# Patient Record
Sex: MALE | Race: WHITE | NOT HISPANIC OR LATINO | URBAN - METROPOLITAN AREA
[De-identification: names, ages, dates, MRNs, and addresses within clinical notes are randomized per-mention and may not be internally consistent; named-entity substitution may affect disease eponyms.]

---

## 2020-09-04 ENCOUNTER — OUTPATIENT (OUTPATIENT)
Dept: OUTPATIENT SERVICES | Facility: HOSPITAL | Age: 55
LOS: 1 days | End: 2020-09-04
Payer: COMMERCIAL

## 2020-09-04 VITALS
WEIGHT: 243.17 LBS | DIASTOLIC BLOOD PRESSURE: 96 MMHG | TEMPERATURE: 97 F | HEIGHT: 71 IN | HEART RATE: 108 BPM | SYSTOLIC BLOOD PRESSURE: 124 MMHG | RESPIRATION RATE: 20 BRPM

## 2020-09-04 DIAGNOSIS — Z91.89 OTHER SPECIFIED PERSONAL RISK FACTORS, NOT ELSEWHERE CLASSIFIED: ICD-10-CM

## 2020-09-04 DIAGNOSIS — Z98.890 OTHER SPECIFIED POSTPROCEDURAL STATES: Chronic | ICD-10-CM

## 2020-09-04 DIAGNOSIS — M51.26 OTHER INTERVERTEBRAL DISC DISPLACEMENT, LUMBAR REGION: ICD-10-CM

## 2020-09-04 DIAGNOSIS — J45.909 UNSPECIFIED ASTHMA, UNCOMPLICATED: ICD-10-CM

## 2020-09-04 DIAGNOSIS — E11.9 TYPE 2 DIABETES MELLITUS WITHOUT COMPLICATIONS: ICD-10-CM

## 2020-09-04 DIAGNOSIS — S82.899A OTHER FRACTURE OF UNSPECIFIED LOWER LEG, INITIAL ENCOUNTER FOR CLOSED FRACTURE: Chronic | ICD-10-CM

## 2020-09-04 DIAGNOSIS — Z01.818 ENCOUNTER FOR OTHER PREPROCEDURAL EXAMINATION: ICD-10-CM

## 2020-09-04 DIAGNOSIS — Z29.9 ENCOUNTER FOR PROPHYLACTIC MEASURES, UNSPECIFIED: ICD-10-CM

## 2020-09-04 DIAGNOSIS — I10 ESSENTIAL (PRIMARY) HYPERTENSION: ICD-10-CM

## 2020-09-04 LAB
A1C WITH ESTIMATED AVERAGE GLUCOSE RESULT: 6.5 % — HIGH (ref 4–5.6)
ANION GAP SERPL CALC-SCNC: 16 MMOL/L — SIGNIFICANT CHANGE UP (ref 5–17)
APTT BLD: 29.5 SEC — SIGNIFICANT CHANGE UP (ref 27.5–35.5)
BASOPHILS # BLD AUTO: 0.04 K/UL — SIGNIFICANT CHANGE UP (ref 0–0.2)
BASOPHILS NFR BLD AUTO: 0.4 % — SIGNIFICANT CHANGE UP (ref 0–2)
BLD GP AB SCN SERPL QL: SIGNIFICANT CHANGE UP
BUN SERPL-MCNC: 23 MG/DL — HIGH (ref 8–20)
CALCIUM SERPL-MCNC: 9.9 MG/DL — SIGNIFICANT CHANGE UP (ref 8.6–10.2)
CHLORIDE SERPL-SCNC: 98 MMOL/L — SIGNIFICANT CHANGE UP (ref 98–107)
CO2 SERPL-SCNC: 24 MMOL/L — SIGNIFICANT CHANGE UP (ref 22–29)
CREAT SERPL-MCNC: 1.01 MG/DL — SIGNIFICANT CHANGE UP (ref 0.5–1.3)
EOSINOPHIL # BLD AUTO: 0.1 K/UL — SIGNIFICANT CHANGE UP (ref 0–0.5)
EOSINOPHIL NFR BLD AUTO: 0.9 % — SIGNIFICANT CHANGE UP (ref 0–6)
ESTIMATED AVERAGE GLUCOSE: 140 MG/DL — HIGH (ref 68–114)
GLUCOSE SERPL-MCNC: 114 MG/DL — HIGH (ref 70–99)
HCT VFR BLD CALC: 42.8 % — SIGNIFICANT CHANGE UP (ref 39–50)
HGB BLD-MCNC: 15.2 G/DL — SIGNIFICANT CHANGE UP (ref 13–17)
IMM GRANULOCYTES NFR BLD AUTO: 0.4 % — SIGNIFICANT CHANGE UP (ref 0–1.5)
INR BLD: 1.06 RATIO — SIGNIFICANT CHANGE UP (ref 0.88–1.16)
LYMPHOCYTES # BLD AUTO: 2.71 K/UL — SIGNIFICANT CHANGE UP (ref 1–3.3)
LYMPHOCYTES # BLD AUTO: 24.6 % — SIGNIFICANT CHANGE UP (ref 13–44)
MCHC RBC-ENTMCNC: 32.1 PG — SIGNIFICANT CHANGE UP (ref 27–34)
MCHC RBC-ENTMCNC: 35.5 GM/DL — SIGNIFICANT CHANGE UP (ref 32–36)
MCV RBC AUTO: 90.3 FL — SIGNIFICANT CHANGE UP (ref 80–100)
MONOCYTES # BLD AUTO: 1.24 K/UL — HIGH (ref 0–0.9)
MONOCYTES NFR BLD AUTO: 11.2 % — SIGNIFICANT CHANGE UP (ref 2–14)
MRSA PCR RESULT.: SIGNIFICANT CHANGE UP
NEUTROPHILS # BLD AUTO: 6.9 K/UL — SIGNIFICANT CHANGE UP (ref 1.8–7.4)
NEUTROPHILS NFR BLD AUTO: 62.5 % — SIGNIFICANT CHANGE UP (ref 43–77)
PLATELET # BLD AUTO: 215 K/UL — SIGNIFICANT CHANGE UP (ref 150–400)
POTASSIUM SERPL-MCNC: 3.9 MMOL/L — SIGNIFICANT CHANGE UP (ref 3.5–5.3)
POTASSIUM SERPL-SCNC: 3.9 MMOL/L — SIGNIFICANT CHANGE UP (ref 3.5–5.3)
PROTHROM AB SERPL-ACNC: 12.3 SEC — SIGNIFICANT CHANGE UP (ref 10.6–13.6)
RBC # BLD: 4.74 M/UL — SIGNIFICANT CHANGE UP (ref 4.2–5.8)
RBC # FLD: 11.9 % — SIGNIFICANT CHANGE UP (ref 10.3–14.5)
S AUREUS DNA NOSE QL NAA+PROBE: DETECTED
SODIUM SERPL-SCNC: 138 MMOL/L — SIGNIFICANT CHANGE UP (ref 135–145)
WBC # BLD: 11.03 K/UL — HIGH (ref 3.8–10.5)
WBC # FLD AUTO: 11.03 K/UL — HIGH (ref 3.8–10.5)

## 2020-09-04 PROCEDURE — 93005 ELECTROCARDIOGRAM TRACING: CPT

## 2020-09-04 PROCEDURE — 93010 ELECTROCARDIOGRAM REPORT: CPT

## 2020-09-04 NOTE — H&P PST ADULT - TOBACCO USE
history of brain tumor, non compliant with medications. family wanted him brought to a facility, patient became aggressive    picked up by ems today for aggressive behavior Current some day smoker

## 2020-09-04 NOTE — H&P PST ADULT - NSICDXPASTMEDICALHX_GEN_ALL_CORE_FT
PAST MEDICAL HISTORY:  Asthma     HLD (hyperlipidemia)     Hypertension     No pertinent past medical history     Tear meniscus knee Left    Type 2 diabetes mellitus PAST MEDICAL HISTORY:  Asthma     Disc displacement, lumbar     HLD (hyperlipidemia)     Hypertension     Panic attacks     Tear meniscus knee Left    Type 2 diabetes mellitus

## 2020-09-04 NOTE — H&P PST ADULT - LAB RESULTS AND INTERPRETATION
labs pending WBC 11.03  BUN 23.0  A1C 6.5  labs faxed to Dr. Sotelo WBC 11.03  BUN 23.0  A1C 6.5  mssa positive prescription sent to pharmacy , pt notified and  Dr. Rendon  office called message left for veronica . K DAmico Np 6 pm 9/4/2020  labs faxed to Dr. Sotelo

## 2020-09-04 NOTE — H&P PST ADULT - MUSCULOSKELETAL
details… detailed exam decreased ROM/no calf tenderness/normal strength/neck dec ROM, lower back dec ROM

## 2020-09-04 NOTE — H&P PST ADULT - NSANTHOSAYNRD_GEN_A_CORE
No. JEROMY screening performed.  STOP BANG Legend: 0-2 = LOW Risk; 3-4 = INTERMEDIATE Risk; 5-8 = HIGH Risk

## 2020-09-04 NOTE — H&P PST ADULT - NSICDXPASTSURGICALHX_GEN_ALL_CORE_FT
PAST SURGICAL HISTORY:  Fx ankle surgery to repair 1992    Status post medial meniscal repair Leftx2 2007,2015

## 2020-09-04 NOTE — H&P PST ADULT - PSY GEN HX ROS MEA POS PC
depression/anxiety/anxious hisstory of panic attacks, some depression, NO SI anxiety/depression/anxious history of panic attacks, some depression, NO SI

## 2020-09-04 NOTE — H&P PST ADULT - ASSESSMENT
OPIOID RISK TOOL    ROCHELLE EACH BOX THAT APPLIES AND ADD TOTALS AT THE END    FAMILY HISTORY OF SUBSTANCE ABUSE                 FEMALE         MALE                                                Alcohol                             [  ]1 pt          [  ]3pts                                               Illegal Durgs                     [  ]2 pts        [  ]3pts                                               Rx Drugs                           [  ]4 pts        [  ]4 pts    PERSONAL HISTORY OF SUBSTANCE ABUSE                                                                                          Alcohol                             [  ]3 pts       [  ]3 pts                                               Illegal Durgs                     [  ]4 pts        [  ]4 pts                                               Rx Drugs                           [  ]5 pts        [  ]5 pts    AGE BETWEEN 16-45 YEARS                                      [  ]1 pt         [  ]1 pt    HISTORY OF PREADOLESCENT   SEXUAL ABUSE                                                             [  ]3 pts        [  ]0pts    PSYCHOLOGICAL DISEASE                     ADD, OCD, Bipolar, Schizophrenia        [  ]2 pts         [  ]2 pts                      Depression                                               [  ]1 pt           [  ]1 pt           SCORING TOTAL   0                                A score of 3 or lower indicated LOW risk for future opiod abuse  A score of 4 to 7 indicated moderate risk for future opiod abuse  A score of 8 or higher indicates a high risk for opiod abuse    CAPRINI SCORE [CLOT]    AGE RELATED RISK FACTORS                                                       MOBILITY RELATED FACTORS  [x] Age 41-60 years                                            (1 Point)                  [ ] Bed rest                                                        (1 Point)  [ ] Age: 61-74 years                                           (2 Points)                 [ ] Plaster cast                                                   (2 Points)  [ ] Age= 75 years                                              (3 Points)                 [ ] Bed bound for more than 72 hours                 (2 Points)    DISEASE RELATED RISK FACTORS                                               GENDER SPECIFIC FACTORS  [ ] Edema in the lower extremities                       (1 Point)                  [ ] Pregnancy                                                     (1 Point)  [ ] Varicose veins                                               (1 Point)                  [ ] Post-partum < 6 weeks                                   (1 Point)             [x ] BMI > 25 Kg/m2                                            (1 Point)                  [ ] Hormonal therapy  or oral contraception          (1 Point)                 [ ] Sepsis (in the previous month)                        (1 Point)                  [ ] History of pregnancy complications                 (1 point)  [ ] Pneumonia or serious lung disease                                               [ ] Unexplained or recurrent                     (1 Point)           (in the previous month)                               (1 Point)  [ ] Abnormal pulmonary function test                     (1 Point)                 SURGERY RELATED RISK FACTORS  [ ] Acute myocardial infarction                              (1 Point)                 [ ]  Section                                             (1 Point)  [ ] Congestive heart failure (in the previous month)  (1 Point)               [ ] Minor surgery                                                  (1 Point)   [ ] Inflammatory bowel disease                             (1 Point)                 [ ] Arthroscopic surgery                                        (2 Points)  [ ] Central venous access                                      (2 Points)                [x ] General surgery lasting more than 45 minutes   (2 Points)       [ ] Stroke (in the previous month)                          (5 Points)               [ ] Elective arthroplasty                                         (5 Points)                                                                                                                                               HEMATOLOGY RELATED FACTORS                                                 TRAUMA RELATED RISK FACTORS  [ ] Prior episodes of VTE                                     (3 Points)                [ ] Fracture of the hip, pelvis, or leg                       (5 Points)  [ ] Positive family history for VTE                         (3 Points)                 [ ] Acute spinal cord injury (in the previous month)  (5 Points)  [ ] Prothrombin 24903 A                                     (3 Points)                 [ ] Paralysis  (less than 1 month)                             (5 Points)  [ ] Factor V Leiden                                             (3 Points)                  [ ] Multiple Trauma within 1 month                        (5 Points)  [ ] Lupus anticoagulants                                     (3 Points)                                                           [ ] Anticardiolipin antibodies                               (3 Points)                                                       [ ] High homocysteine in the blood                      (3 Points)                                             [ ] Other congenital or acquired thrombophilia      (3 Points)                                                [ ] Heparin induced thrombocytopenia                  (3 Points)                                          Total Score [    4      ]    Caprini Score 0 - 2:  Low Risk, No VTE Prophylaxis required for most patients, encourage ambulation  Caprini Score 3 - 6:  At Risk, pharmacologic VTE prophylaxis is indicated for most patients (in the absence of a contraindication)  Caprini Score Greater than or = 7:  High Risk, pharmacologic VTE prophylaxis is indicated for most patients (in the absence of a contraindication)    Patient is scheduled for lumbar laminectomy and discectomy L4-L5, L5-S1 RIGHT with Dr. Rendon on 20 OPIOID RISK TOOL    ROCHELLE EACH BOX THAT APPLIES AND ADD TOTALS AT THE END    FAMILY HISTORY OF SUBSTANCE ABUSE                 FEMALE         MALE                                                Alcohol                             [  ]1 pt          [  ]3pts                                               Illegal Durgs                     [  ]2 pts        [  ]3pts                                               Rx Drugs                           [  ]4 pts        [  ]4 pts    PERSONAL HISTORY OF SUBSTANCE ABUSE                                                                                          Alcohol                             [  ]3 pts       [  ]3 pts                                               Illegal Durgs                     [  ]4 pts        [  ]4 pts                                               Rx Drugs                           [  ]5 pts        [  ]5 pts    AGE BETWEEN 16-45 YEARS                                      [  ]1 pt         [  ]1 pt    HISTORY OF PREADOLESCENT   SEXUAL ABUSE                                                             [  ]3 pts        [  ]0pts    PSYCHOLOGICAL DISEASE                     ADD, OCD, Bipolar, Schizophrenia        [  ]2 pts         [  ]2 pts                      Depression                                               [  ]1 pt           [  ]1 pt           SCORING TOTAL   0                                A score of 3 or lower indicated LOW risk for future opiod abuse  A score of 4 to 7 indicated moderate risk for future opiod abuse  A score of 8 or higher indicates a high risk for opiod abuse    CAPRINI SCORE [CLOT]    AGE RELATED RISK FACTORS                                                       MOBILITY RELATED FACTORS  [x] Age 41-60 years                                            (1 Point)                  [ ] Bed rest                                                        (1 Point)  [ ] Age: 61-74 years                                           (2 Points)                 [ ] Plaster cast                                                   (2 Points)  [ ] Age= 75 years                                              (3 Points)                 [ ] Bed bound for more than 72 hours                 (2 Points)    DISEASE RELATED RISK FACTORS                                               GENDER SPECIFIC FACTORS  [ ] Edema in the lower extremities                       (1 Point)                  [ ] Pregnancy                                                     (1 Point)  [ ] Varicose veins                                               (1 Point)                  [ ] Post-partum < 6 weeks                                   (1 Point)             [x ] BMI > 25 Kg/m2                                            (1 Point)                  [ ] Hormonal therapy  or oral contraception          (1 Point)                 [ ] Sepsis (in the previous month)                        (1 Point)                  [ ] History of pregnancy complications                 (1 point)  [ ] Pneumonia or serious lung disease                                               [ ] Unexplained or recurrent                     (1 Point)           (in the previous month)                               (1 Point)  [ ] Abnormal pulmonary function test                     (1 Point)                 SURGERY RELATED RISK FACTORS  [ ] Acute myocardial infarction                              (1 Point)                 [ ]  Section                                             (1 Point)  [ ] Congestive heart failure (in the previous month)  (1 Point)               [ ] Minor surgery                                                  (1 Point)   [ ] Inflammatory bowel disease                             (1 Point)                 [ ] Arthroscopic surgery                                        (2 Points)  [ ] Central venous access                                      (2 Points)                [x ] General surgery lasting more than 45 minutes   (2 Points)       [ ] Stroke (in the previous month)                          (5 Points)               [ ] Elective arthroplasty                                         (5 Points)                                                                                                                                               HEMATOLOGY RELATED FACTORS                                                 TRAUMA RELATED RISK FACTORS  [ ] Prior episodes of VTE                                     (3 Points)                [ ] Fracture of the hip, pelvis, or leg                       (5 Points)  [ ] Positive family history for VTE                         (3 Points)                 [ ] Acute spinal cord injury (in the previous month)  (5 Points)  [ ] Prothrombin 53860 A                                     (3 Points)                 [ ] Paralysis  (less than 1 month)                             (5 Points)  [ ] Factor V Leiden                                             (3 Points)                  [ ] Multiple Trauma within 1 month                        (5 Points)  [ ] Lupus anticoagulants                                     (3 Points)                                                           [ ] Anticardiolipin antibodies                               (3 Points)                                                       [ ] High homocysteine in the blood                      (3 Points)                                             [ ] Other congenital or acquired thrombophilia      (3 Points)                                                [ ] Heparin induced thrombocytopenia                  (3 Points)                                          Total Score [    4      ]    Caprini Score 0 - 2:  Low Risk, No VTE Prophylaxis required for most patients, encourage ambulation  Caprini Score 3 - 6:  At Risk, pharmacologic VTE prophylaxis is indicated for most patients (in the absence of a contraindication)  Caprini Score Greater than or = 7:  High Risk, pharmacologic VTE prophylaxis is indicated for most patients (in the absence of a contraindication)    Patient is scheduled for lumbar laminotomy and discectomy L4-L5, L5-S1 RIGHT with Dr. Rendon on 20

## 2020-09-04 NOTE — PATIENT PROFILE ADULT - NSPROEDALEARNPREF_GEN_A_NUR
individual instruction/verbal instruction/written material/Instructed pt on pre-op instructions/teaching, tips for safer surgery, pain management scale, pre-surgical infection prevention instructions, MRSA/MSSA instructions, Covid swab appt info, Diabetes club schedule, smoking cessation center for tobacco control course schedule given. Pt verbalized understanding of all instructions given. verbal instruction/individual instruction/written material/Instructed pt on pre-op instructions/teaching, tips for safer surgery, pain management scale, pre-surgical infection prevention instructions, MRSA/MSSA instructions, Covid swab appt info, Diabetes club schedule, smoking cessation center for tobacco control course schedule given, Spine Book given at PST. Pt verbalized understanding of all instructions given.

## 2020-09-04 NOTE — PATIENT PROFILE ADULT - NSPROCHRONICPAIN_GEN_A_NUR
Pain at rest - 6/10  Pain with activity - 9/10  Duration -  2014  Constant, spasm, aching, sharp, shooting pain/yes

## 2020-09-04 NOTE — H&P PST ADULT - HISTORY OF PRESENT ILLNESS
sustained an injury while working as an , fell through a flight of steps in January 2015. managing conservatively with tramadol and celebrex.  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 Rodas  worse with sitting/ standing  relieved with laying down  radiates down right leg numbness/tingling  sharp pain 9/10  urinary- weak stream   bowel  no constipation    occasional panic attacks 54 year old male with a pmhx of HTN, HLD, DM Type 2, sustained an injury while working as an , fell through a flight of steps in January 2015. managing conservatively with tramadol and celebrex.  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 Rodas  worse with sitting/ standing  relieved with laying down  radiates down right leg numbness/tingling  sharp pain 9/10  urinary- weak stream   bowel  no constipation    occasional panic attacks 54 year old male with a pmhx of HTN, HLD, DM Type 2, panic attacks, disc displacement with nerve compression L-L5-S1.  The patient states he sustained an injury while working as an  and fell through a flight of steps in January 2015. He has been managing conservatively with pain medications (Tramadol 100mg twice daily, Celebrex 200mg twice a day).  He has received epidural injections in the past, however suffered a spinal cord leak and has discontinued since.  He follows with pain management Dr. Rodas.  He states that the pain is sharp 9/10, radiates down his right leg + numbness and tingling, worse with standing and sitting, relieved with laying down. He reports a weak urinary stream but denies bowel or bladder incontinence. Patient is scheduled for lumbar laminectomy and discectomy L4-L5, L5-S1 RIGHT with Dr. Rendon on 9/22/20 54 year old male with a pmhx of HTN, HLD, DM Type 2, panic attacks, disc displacement with nerve compression L-L5-S1.  The patient states he sustained an injury while working as an  and fell through a flight of steps in January 2015. He has been managing conservatively with pain medications (Tramadol 100mg twice daily, Celebrex 200mg twice a day).  He has received epidural injections in the past, however suffered a spinal cord leak and has discontinued since.  He follows with pain management Dr. Rodas.  He states that the pain is sharp 9/10, radiates down his right leg + numbness and tingling, worse with standing and sitting, relieved with laying down. He reports a weak urinary stream but denies bowel or bladder incontinence. Patient is scheduled for lumbar laminotomy and discectomy L4-L5, L5-S1 RIGHT with Dr. Rendon on 9/22/20

## 2020-09-04 NOTE — H&P PST ADULT - NSICDXFAMILYHX_GEN_ALL_CORE_FT
FAMILY HISTORY:  Father  Still living? Unknown  Family history of diabetes mellitus (DM), Age at diagnosis: Age Unknown    Mother  Still living? Unknown  FH: esophageal cancer, Age at diagnosis: Age Unknown    Grandparent  Still living? Unknown  FH: prostate cancer, Age at diagnosis: Age Unknown

## 2020-09-17 DIAGNOSIS — Z01.818 ENCOUNTER FOR OTHER PREPROCEDURAL EXAMINATION: ICD-10-CM

## 2020-09-19 ENCOUNTER — APPOINTMENT (OUTPATIENT)
Dept: DISASTER EMERGENCY | Facility: CLINIC | Age: 55
End: 2020-09-19

## 2020-09-20 LAB — SARS-COV-2 N GENE NPH QL NAA+PROBE: NOT DETECTED

## 2020-09-21 ENCOUNTER — TRANSCRIPTION ENCOUNTER (OUTPATIENT)
Age: 55
End: 2020-09-21

## 2020-09-22 ENCOUNTER — INPATIENT (INPATIENT)
Facility: HOSPITAL | Age: 55
LOS: 0 days | Discharge: HOME | DRG: 517 | End: 2020-09-23
Attending: SPECIALIST | Admitting: SPECIALIST
Payer: COMMERCIAL

## 2020-09-22 ENCOUNTER — TRANSCRIPTION ENCOUNTER (OUTPATIENT)
Age: 55
End: 2020-09-22

## 2020-09-22 VITALS
HEIGHT: 71 IN | HEART RATE: 93 BPM | OXYGEN SATURATION: 96 % | WEIGHT: 243.17 LBS | RESPIRATION RATE: 16 BRPM | SYSTOLIC BLOOD PRESSURE: 127 MMHG | DIASTOLIC BLOOD PRESSURE: 90 MMHG | TEMPERATURE: 97 F

## 2020-09-22 DIAGNOSIS — Z98.890 OTHER SPECIFIED POSTPROCEDURAL STATES: Chronic | ICD-10-CM

## 2020-09-22 DIAGNOSIS — S82.899A OTHER FRACTURE OF UNSPECIFIED LOWER LEG, INITIAL ENCOUNTER FOR CLOSED FRACTURE: Chronic | ICD-10-CM

## 2020-09-22 DIAGNOSIS — M51.26 OTHER INTERVERTEBRAL DISC DISPLACEMENT, LUMBAR REGION: ICD-10-CM

## 2020-09-22 LAB
ABO RH CONFIRMATION: SIGNIFICANT CHANGE UP
GLUCOSE BLDC GLUCOMTR-MCNC: 124 MG/DL — HIGH (ref 70–99)
GLUCOSE BLDC GLUCOMTR-MCNC: 138 MG/DL — HIGH (ref 70–99)
GLUCOSE BLDC GLUCOMTR-MCNC: 308 MG/DL — HIGH (ref 70–99)

## 2020-09-22 RX ORDER — HYDROMORPHONE HYDROCHLORIDE 2 MG/ML
1 INJECTION INTRAMUSCULAR; INTRAVENOUS; SUBCUTANEOUS
Refills: 0 | Status: DISCONTINUED | OUTPATIENT
Start: 2020-09-22 | End: 2020-09-22

## 2020-09-22 RX ORDER — ACETAMINOPHEN 500 MG
650 TABLET ORAL EVERY 6 HOURS
Refills: 0 | Status: DISCONTINUED | OUTPATIENT
Start: 2020-09-22 | End: 2020-09-23

## 2020-09-22 RX ORDER — DEXTROSE MONOHYDRATE, SODIUM CHLORIDE, AND POTASSIUM CHLORIDE 50; .745; 4.5 G/1000ML; G/1000ML; G/1000ML
1000 INJECTION, SOLUTION INTRAVENOUS
Refills: 0 | Status: DISCONTINUED | OUTPATIENT
Start: 2020-09-22 | End: 2020-09-23

## 2020-09-22 RX ORDER — PANTOPRAZOLE SODIUM 20 MG/1
40 TABLET, DELAYED RELEASE ORAL
Refills: 0 | Status: DISCONTINUED | OUTPATIENT
Start: 2020-09-22 | End: 2020-09-23

## 2020-09-22 RX ORDER — DEXTROSE 50 % IN WATER 50 %
15 SYRINGE (ML) INTRAVENOUS ONCE
Refills: 0 | Status: DISCONTINUED | OUTPATIENT
Start: 2020-09-22 | End: 2020-09-23

## 2020-09-22 RX ORDER — DEXTROSE 50 % IN WATER 50 %
12.5 SYRINGE (ML) INTRAVENOUS ONCE
Refills: 0 | Status: DISCONTINUED | OUTPATIENT
Start: 2020-09-22 | End: 2020-09-23

## 2020-09-22 RX ORDER — INSULIN LISPRO 100/ML
VIAL (ML) SUBCUTANEOUS
Refills: 0 | Status: DISCONTINUED | OUTPATIENT
Start: 2020-09-22 | End: 2020-09-23

## 2020-09-22 RX ORDER — DEXTROSE 50 % IN WATER 50 %
25 SYRINGE (ML) INTRAVENOUS ONCE
Refills: 0 | Status: DISCONTINUED | OUTPATIENT
Start: 2020-09-22 | End: 2020-09-23

## 2020-09-22 RX ORDER — SODIUM CHLORIDE 9 MG/ML
3 INJECTION INTRAMUSCULAR; INTRAVENOUS; SUBCUTANEOUS EVERY 8 HOURS
Refills: 0 | Status: DISCONTINUED | OUTPATIENT
Start: 2020-09-22 | End: 2020-09-22

## 2020-09-22 RX ORDER — ONDANSETRON 8 MG/1
4 TABLET, FILM COATED ORAL ONCE
Refills: 0 | Status: DISCONTINUED | OUTPATIENT
Start: 2020-09-22 | End: 2020-09-22

## 2020-09-22 RX ORDER — SODIUM CHLORIDE 9 MG/ML
1000 INJECTION, SOLUTION INTRAVENOUS
Refills: 0 | Status: DISCONTINUED | OUTPATIENT
Start: 2020-09-22 | End: 2020-09-22

## 2020-09-22 RX ORDER — HYDROCHLOROTHIAZIDE 25 MG
12.5 TABLET ORAL DAILY
Refills: 0 | Status: DISCONTINUED | OUTPATIENT
Start: 2020-09-22 | End: 2020-09-23

## 2020-09-22 RX ORDER — HYDROMORPHONE HYDROCHLORIDE 2 MG/ML
1 INJECTION INTRAMUSCULAR; INTRAVENOUS; SUBCUTANEOUS EVERY 6 HOURS
Refills: 0 | Status: DISCONTINUED | OUTPATIENT
Start: 2020-09-22 | End: 2020-09-23

## 2020-09-22 RX ORDER — OXYCODONE AND ACETAMINOPHEN 5; 325 MG/1; MG/1
2 TABLET ORAL EVERY 6 HOURS
Refills: 0 | Status: DISCONTINUED | OUTPATIENT
Start: 2020-09-22 | End: 2020-09-23

## 2020-09-22 RX ORDER — CEFAZOLIN SODIUM 1 G
2000 VIAL (EA) INJECTION ONCE
Refills: 0 | Status: DISCONTINUED | OUTPATIENT
Start: 2020-09-22 | End: 2020-09-22

## 2020-09-22 RX ORDER — ONDANSETRON 8 MG/1
4 TABLET, FILM COATED ORAL EVERY 8 HOURS
Refills: 0 | Status: DISCONTINUED | OUTPATIENT
Start: 2020-09-22 | End: 2020-09-23

## 2020-09-22 RX ORDER — HYDROMORPHONE HYDROCHLORIDE 2 MG/ML
0.5 INJECTION INTRAMUSCULAR; INTRAVENOUS; SUBCUTANEOUS ONCE
Refills: 0 | Status: DISCONTINUED | OUTPATIENT
Start: 2020-09-22 | End: 2020-09-22

## 2020-09-22 RX ORDER — ALBUTEROL 90 UG/1
2 AEROSOL, METERED ORAL EVERY 4 HOURS
Refills: 0 | Status: DISCONTINUED | OUTPATIENT
Start: 2020-09-22 | End: 2020-09-23

## 2020-09-22 RX ORDER — CELECOXIB 200 MG/1
200 CAPSULE ORAL
Refills: 0 | Status: DISCONTINUED | OUTPATIENT
Start: 2020-09-22 | End: 2020-09-23

## 2020-09-22 RX ORDER — FENTANYL CITRATE 50 UG/ML
50 INJECTION INTRAVENOUS
Refills: 0 | Status: DISCONTINUED | OUTPATIENT
Start: 2020-09-22 | End: 2020-09-22

## 2020-09-22 RX ORDER — VALSARTAN 80 MG/1
160 TABLET ORAL DAILY
Refills: 0 | Status: DISCONTINUED | OUTPATIENT
Start: 2020-09-22 | End: 2020-09-23

## 2020-09-22 RX ORDER — CYCLOBENZAPRINE HYDROCHLORIDE 10 MG/1
10 TABLET, FILM COATED ORAL EVERY 8 HOURS
Refills: 0 | Status: DISCONTINUED | OUTPATIENT
Start: 2020-09-22 | End: 2020-09-23

## 2020-09-22 RX ORDER — ALPRAZOLAM 0.25 MG
1 TABLET ORAL DAILY
Refills: 0 | Status: DISCONTINUED | OUTPATIENT
Start: 2020-09-22 | End: 2020-09-23

## 2020-09-22 RX ORDER — ATORVASTATIN CALCIUM 80 MG/1
20 TABLET, FILM COATED ORAL AT BEDTIME
Refills: 0 | Status: DISCONTINUED | OUTPATIENT
Start: 2020-09-22 | End: 2020-09-23

## 2020-09-22 RX ORDER — OXYCODONE AND ACETAMINOPHEN 5; 325 MG/1; MG/1
1 TABLET ORAL EVERY 4 HOURS
Refills: 0 | Status: DISCONTINUED | OUTPATIENT
Start: 2020-09-22 | End: 2020-09-23

## 2020-09-22 RX ORDER — SODIUM CHLORIDE 9 MG/ML
1000 INJECTION, SOLUTION INTRAVENOUS
Refills: 0 | Status: DISCONTINUED | OUTPATIENT
Start: 2020-09-22 | End: 2020-09-23

## 2020-09-22 RX ORDER — GLUCAGON INJECTION, SOLUTION 0.5 MG/.1ML
1 INJECTION, SOLUTION SUBCUTANEOUS ONCE
Refills: 0 | Status: DISCONTINUED | OUTPATIENT
Start: 2020-09-22 | End: 2020-09-23

## 2020-09-22 RX ADMIN — HYDROMORPHONE HYDROCHLORIDE 1 MILLIGRAM(S): 2 INJECTION INTRAMUSCULAR; INTRAVENOUS; SUBCUTANEOUS at 16:18

## 2020-09-22 RX ADMIN — Medication 8: at 21:30

## 2020-09-22 RX ADMIN — CELECOXIB 200 MILLIGRAM(S): 200 CAPSULE ORAL at 21:29

## 2020-09-22 RX ADMIN — OXYCODONE AND ACETAMINOPHEN 2 TABLET(S): 5; 325 TABLET ORAL at 19:41

## 2020-09-22 RX ADMIN — CELECOXIB 200 MILLIGRAM(S): 200 CAPSULE ORAL at 22:25

## 2020-09-22 RX ADMIN — DEXTROSE MONOHYDRATE, SODIUM CHLORIDE, AND POTASSIUM CHLORIDE 75 MILLILITER(S): 50; .745; 4.5 INJECTION, SOLUTION INTRAVENOUS at 19:42

## 2020-09-22 RX ADMIN — OXYCODONE AND ACETAMINOPHEN 2 TABLET(S): 5; 325 TABLET ORAL at 20:40

## 2020-09-22 RX ADMIN — HYDROMORPHONE HYDROCHLORIDE 0.5 MILLIGRAM(S): 2 INJECTION INTRAMUSCULAR; INTRAVENOUS; SUBCUTANEOUS at 22:30

## 2020-09-22 RX ADMIN — HYDROMORPHONE HYDROCHLORIDE 0.5 MILLIGRAM(S): 2 INJECTION INTRAMUSCULAR; INTRAVENOUS; SUBCUTANEOUS at 21:35

## 2020-09-22 RX ADMIN — ATORVASTATIN CALCIUM 20 MILLIGRAM(S): 80 TABLET, FILM COATED ORAL at 23:19

## 2020-09-22 NOTE — DISCHARGE NOTE PROVIDER - NSDCCPTREATMENT_GEN_ALL_CORE_FT
PRINCIPAL PROCEDURE  Procedure: Laminotomy and foraminotomy, spine, lumbar  Findings and Treatment:

## 2020-09-22 NOTE — DISCHARGE NOTE PROVIDER - NSDCFUADDINST_GEN_ALL_CORE_FT
Follow-up with Dr. Rendon within 7-10 days. Dressing change daily until dry, then leave dressing in place until office follow-up. Pain medications as indicated and titrated to patient's needs.  Ambulate with assistance of walker. Contact office if the wound becomes red, opens or discharge increases.

## 2020-09-22 NOTE — DISCHARGE NOTE PROVIDER - NSDCMRMEDTOKEN_GEN_ALL_CORE_FT
Bydureon Pen 2 mg subcutaneous injection, extended release:   CeleBREX 200 mg oral capsule: orally 2 times a day  Lipitor 20 mg oral tablet: 1 tab(s) orally once a day  metFORMIN 500 mg oral tablet, extended release: orally 2 times a day  NexIUM 40 mg oral delayed release capsule: 1 cap(s) orally once a day  traMADol 100 mg oral tablet: 1 tab(s) orally 2 times a day  valsartan-hydrochlorothiazide 160mg-12.5mg oral tablet: 1 tab(s) orally once a day  Vascepa 1 g oral capsule: orally 4 times a day  Ventolin HFA 90 mcg/inh inhalation aerosol: 2 puff(s) inhaled every 6 hours  Xanax 1 mg oral tablet: orally once a day   Bydureon Pen 2 mg subcutaneous injection, extended release:   CeleBREX 200 mg oral capsule: orally 2 times a day  cyclobenzaprine 10 mg oral tablet: 1 tab(s) orally every 8 hours, As needed, spasm MDD:3  Lipitor 20 mg oral tablet: 1 tab(s) orally once a day  metFORMIN 500 mg oral tablet, extended release: orally 2 times a day  NexIUM 40 mg oral delayed release capsule: 1 cap(s) orally once a day  oxycodone-acetaminophen 5 mg-325 mg oral tablet: 1-2 tab(s) orally every 4 to 6 hours, As Needed  Pain MDD:6  Senna S 50 mg-8.6 mg oral tablet: 2 tab(s) orally once a day (at bedtime)   valsartan-hydrochlorothiazide 160mg-12.5mg oral tablet: 1 tab(s) orally once a day  Vascepa 1 g oral capsule: orally 4 times a day  Ventolin HFA 90 mcg/inh inhalation aerosol: 2 puff(s) inhaled every 6 hours  Xanax 1 mg oral tablet: orally once a day

## 2020-09-22 NOTE — PROGRESS NOTE ADULT - SUBJECTIVE AND OBJECTIVE BOX
Orthopedic PA Postop Note  Patient S/P L4-S1 lami and discectomy  Patient in bed comfortable     Neurological: Sensation is grossly intact to light touch. No focal deficits or weaknesses found.    Pathologic reflexes: negative babinski, negative clonus            Motor exam: [  ]          [ ] Upper extremity                   Bi(c5)  WE(c6)  EE(c7)   FF(c8)                                                R         5/5        5/5        5/5       5/5                                               L          5/5        5/5        5/5       5/5         [ ] Lower extremeity               HF(l2)   KE(l3)    TA(l4)   EHL(l5)  GS(s1)                                                 R        5/5        5/5        5/5       5/5         5/5                                               L         5/5        5/5       5/5       5/5          5/5     Vital Signs Last 24 Hrs  T(C): 36.8 (22 Sep 2020 20:37), Max: 36.8 (22 Sep 2020 20:37)  T(F): 98.3 (22 Sep 2020 20:37), Max: 98.3 (22 Sep 2020 20:37)  HR: 101 (22 Sep 2020 20:37) (73 - 101)  BP: 126/82 (22 Sep 2020 20:37) (100/72 - 127/90)  BP(mean): --  RR: 18 (22 Sep 2020 18:31) (11 - 18)  SpO2: 91% (22 Sep 2020 20:37) (91% - 100%)    A/P:  S/P L4-S1 lami and discectomy  1. DVTP - SCDs  2. Physical Therapy   3. Pain Control as clinically indicated

## 2020-09-22 NOTE — PHYSICAL THERAPY INITIAL EVALUATION ADULT - ADDITIONAL COMMENTS
pt reports he [place] brother [place] with 5 steps to enter + 5 inside. pt reports brother works during the day and will be alone until 6 pm. pt reports independence with all ADLs, drives and no DME.

## 2020-09-22 NOTE — DISCHARGE NOTE PROVIDER - NSDCCPCAREPLAN_GEN_ALL_CORE_FT
PRINCIPAL DISCHARGE DIAGNOSIS  Diagnosis: Other intervertebral disc displacement, lumbar region  Assessment and Plan of Treatment:

## 2020-09-22 NOTE — DISCHARGE NOTE PROVIDER - HOSPITAL COURSE
Patient was admitted for elective posterior lumbar laminectomy  on 9/22/20 for failed conservative treatment of persistent lower back pain, The hospital post operative course was uneventful.  The surgical dressing was changed and the drain was removed uneventfully.   PT/OT worked with patient for gait training.  Pain was now adequately controlled and patient was discharged home in stable condition.  Chronic medical conditions were treated during the hospital stay.

## 2020-09-23 ENCOUNTER — TRANSCRIPTION ENCOUNTER (OUTPATIENT)
Age: 55
End: 2020-09-23

## 2020-09-23 VITALS
SYSTOLIC BLOOD PRESSURE: 131 MMHG | DIASTOLIC BLOOD PRESSURE: 90 MMHG | RESPIRATION RATE: 17 BRPM | HEART RATE: 71 BPM | OXYGEN SATURATION: 93 % | TEMPERATURE: 98 F

## 2020-09-23 DIAGNOSIS — I10 ESSENTIAL (PRIMARY) HYPERTENSION: ICD-10-CM

## 2020-09-23 DIAGNOSIS — E78.5 HYPERLIPIDEMIA, UNSPECIFIED: ICD-10-CM

## 2020-09-23 DIAGNOSIS — E11.9 TYPE 2 DIABETES MELLITUS WITHOUT COMPLICATIONS: ICD-10-CM

## 2020-09-23 DIAGNOSIS — D72.829 ELEVATED WHITE BLOOD CELL COUNT, UNSPECIFIED: ICD-10-CM

## 2020-09-23 LAB
ANION GAP SERPL CALC-SCNC: 14 MMOL/L — SIGNIFICANT CHANGE UP (ref 5–17)
BASOPHILS # BLD AUTO: 0.02 K/UL — SIGNIFICANT CHANGE UP (ref 0–0.2)
BASOPHILS NFR BLD AUTO: 0.2 % — SIGNIFICANT CHANGE UP (ref 0–2)
BUN SERPL-MCNC: 18 MG/DL — SIGNIFICANT CHANGE UP (ref 8–20)
CALCIUM SERPL-MCNC: 8.8 MG/DL — SIGNIFICANT CHANGE UP (ref 8.6–10.2)
CHLORIDE SERPL-SCNC: 97 MMOL/L — LOW (ref 98–107)
CO2 SERPL-SCNC: 22 MMOL/L — SIGNIFICANT CHANGE UP (ref 22–29)
CREAT SERPL-MCNC: 0.9 MG/DL — SIGNIFICANT CHANGE UP (ref 0.5–1.3)
EOSINOPHIL # BLD AUTO: 0 K/UL — SIGNIFICANT CHANGE UP (ref 0–0.5)
EOSINOPHIL NFR BLD AUTO: 0 % — SIGNIFICANT CHANGE UP (ref 0–6)
GLUCOSE BLDC GLUCOMTR-MCNC: 196 MG/DL — HIGH (ref 70–99)
GLUCOSE SERPL-MCNC: 140 MG/DL — HIGH (ref 70–99)
HCT VFR BLD CALC: 36 % — LOW (ref 39–50)
HGB BLD-MCNC: 12.4 G/DL — LOW (ref 13–17)
IMM GRANULOCYTES NFR BLD AUTO: 0.8 % — SIGNIFICANT CHANGE UP (ref 0–1.5)
LYMPHOCYTES # BLD AUTO: 1.25 K/UL — SIGNIFICANT CHANGE UP (ref 1–3.3)
LYMPHOCYTES # BLD AUTO: 9.5 % — LOW (ref 13–44)
MCHC RBC-ENTMCNC: 31.4 PG — SIGNIFICANT CHANGE UP (ref 27–34)
MCHC RBC-ENTMCNC: 34.4 GM/DL — SIGNIFICANT CHANGE UP (ref 32–36)
MCV RBC AUTO: 91.1 FL — SIGNIFICANT CHANGE UP (ref 80–100)
MONOCYTES # BLD AUTO: 1.18 K/UL — HIGH (ref 0–0.9)
MONOCYTES NFR BLD AUTO: 8.9 % — SIGNIFICANT CHANGE UP (ref 2–14)
NEUTROPHILS # BLD AUTO: 10.64 K/UL — HIGH (ref 1.8–7.4)
NEUTROPHILS NFR BLD AUTO: 80.6 % — HIGH (ref 43–77)
PLATELET # BLD AUTO: 165 K/UL — SIGNIFICANT CHANGE UP (ref 150–400)
POTASSIUM SERPL-MCNC: 4.3 MMOL/L — SIGNIFICANT CHANGE UP (ref 3.5–5.3)
POTASSIUM SERPL-SCNC: 4.3 MMOL/L — SIGNIFICANT CHANGE UP (ref 3.5–5.3)
RBC # BLD: 3.95 M/UL — LOW (ref 4.2–5.8)
RBC # FLD: 12 % — SIGNIFICANT CHANGE UP (ref 10.3–14.5)
SODIUM SERPL-SCNC: 133 MMOL/L — LOW (ref 135–145)
WBC # BLD: 13.19 K/UL — HIGH (ref 3.8–10.5)
WBC # FLD AUTO: 13.19 K/UL — HIGH (ref 3.8–10.5)

## 2020-09-23 PROCEDURE — 85025 COMPLETE CBC W/AUTO DIFF WBC: CPT

## 2020-09-23 PROCEDURE — 82962 GLUCOSE BLOOD TEST: CPT

## 2020-09-23 PROCEDURE — 99254 IP/OBS CNSLTJ NEW/EST MOD 60: CPT

## 2020-09-23 PROCEDURE — C1889: CPT

## 2020-09-23 PROCEDURE — 80048 BASIC METABOLIC PNL TOTAL CA: CPT

## 2020-09-23 PROCEDURE — 36415 COLL VENOUS BLD VENIPUNCTURE: CPT

## 2020-09-23 PROCEDURE — 76000 FLUOROSCOPY <1 HR PHYS/QHP: CPT

## 2020-09-23 PROCEDURE — 97163 PT EVAL HIGH COMPLEX 45 MIN: CPT

## 2020-09-23 RX ORDER — SENNOSIDES/DOCUSATE SODIUM 8.6MG-50MG
2 TABLET ORAL
Qty: 30 | Refills: 0
Start: 2020-09-23

## 2020-09-23 RX ORDER — TRAMADOL HYDROCHLORIDE 50 MG/1
1 TABLET ORAL
Qty: 0 | Refills: 0 | DISCHARGE

## 2020-09-23 RX ORDER — CYCLOBENZAPRINE HYDROCHLORIDE 10 MG/1
1 TABLET, FILM COATED ORAL
Qty: 30 | Refills: 0
Start: 2020-09-23

## 2020-09-23 RX ADMIN — Medication 2: at 08:17

## 2020-09-23 RX ADMIN — DEXTROSE MONOHYDRATE, SODIUM CHLORIDE, AND POTASSIUM CHLORIDE 75 MILLILITER(S): 50; .745; 4.5 INJECTION, SOLUTION INTRAVENOUS at 08:17

## 2020-09-23 RX ADMIN — CELECOXIB 200 MILLIGRAM(S): 200 CAPSULE ORAL at 06:40

## 2020-09-23 RX ADMIN — OXYCODONE AND ACETAMINOPHEN 2 TABLET(S): 5; 325 TABLET ORAL at 08:17

## 2020-09-23 RX ADMIN — OXYCODONE AND ACETAMINOPHEN 2 TABLET(S): 5; 325 TABLET ORAL at 09:15

## 2020-09-23 RX ADMIN — Medication 12.5 MILLIGRAM(S): at 05:41

## 2020-09-23 RX ADMIN — CELECOXIB 200 MILLIGRAM(S): 200 CAPSULE ORAL at 05:40

## 2020-09-23 RX ADMIN — PANTOPRAZOLE SODIUM 40 MILLIGRAM(S): 20 TABLET, DELAYED RELEASE ORAL at 05:41

## 2020-09-23 RX ADMIN — VALSARTAN 160 MILLIGRAM(S): 80 TABLET ORAL at 05:41

## 2020-09-23 NOTE — DISCHARGE NOTE NURSING/CASE MANAGEMENT/SOCIAL WORK - PATIENT PORTAL LINK FT
You can access the FollowMyHealth Patient Portal offered by [facility] by registering at the following website: [email or web address]. By joining [employer]’s FollowMyHealth portal, you will also be able to view your health information using other applications (apps) compatible with our system.

## 2020-09-23 NOTE — CONSULT NOTE ADULT - PROBLEM SELECTOR RECOMMENDATION 7
DVT prophylaxis  - as per ortho protocol  Opioid induced constipation  prophylaxis - bowel regimen       Problem / Plan - 8: Smoker - smoking cessation counseling provided > 3 min ,   agree with Nicotine patch - states will f/u recommendations .   Wishing to go home now .

## 2020-09-23 NOTE — CONSULT NOTE ADULT - SUBJECTIVE AND OBJECTIVE BOX
PMD : Cindy   Cardio :     Patient is a 54y old  Male who is s/p  L4/5 - L5/S1 laminectomy and foraminotomy , POD #1 . Pain well controlled , no n/v ,   voiding without difficulties , no numbness or tingling of RLE .     CC: low back pain with RLE numbness / tingling - chronic - resolved post op         HPI:  54 year old male with a pmhx of HTN, HLD, DM Type 2, panic attacks, disc displacement with nerve compression L-L5-S1.  The patient states he sustained an injury while working as an  and fell through a flight of steps in January 2015. He has been managing conservatively with pain medications (Tramadol 100mg twice daily, Celebrex 200mg twice a day).  He has received epidural injections in the past, however suffered a spinal cord leak and has discontinued since.  He follows with pain management Dr. Rodas.  He states that the pain is sharp 9/10, radiates down his right leg + numbness and tingling, worse with standing and sitting, relieved with laying down. He reports a weak urinary stream but denies bowel or bladder incontinence. Patient is s/p  lumbar laminotomy and foraminotomy of   L4-L5, L5-S1 .       PAST MEDICAL & SURGICAL HISTORY:  Disc displacement, lumbar    Panic attacks    Asthma    HLD (hyperlipidemia)    Type 2 diabetes mellitus    Hypertension    Tear meniscus knee  Left    Fx ankle  surgery to repair 1992    Status post medial meniscal repair  Leftx2 2007,2015        Social History:  Tobacco - smoker 1/2 PPD 35  yrs   ETOH - socially   Illicit drug abuse - denies    FAMILY HISTORY:  Family history of diabetes mellitus (DM) (Father)    FH: prostate cancer (Grandparent)    FH: esophageal cancer (Mother)        Allergies    No Known Allergies    Intolerances        HOME MEDICATIONS :   · 	Bydureon Pen 2 mg subcutaneous injection, extended release: Last Dose Taken:    · 	Ventolin HFA 90 mcg/inh inhalation aerosol: Last Dose Taken:  , 2 puff(s) inhaled every 6 hours  · 	CeleBREX 200 mg oral capsule: Last Dose Taken:  , orally 2 times a day  · 	Vascepa 1 g oral capsule: Last Dose Taken:  , orally 4 times a day  · 	metFORMIN 500 mg oral tablet, extended release: Last Dose Taken:  , orally 2 times a day  · 	valsartan-hydrochlorothiazide 160mg-12.5mg oral tablet: Last Dose Taken:  , 1 tab(s) orally once a day  · 	NexIUM 40 mg oral delayed release capsule: Last Dose Taken:  , 1 cap(s) orally once a day  · 	Lipitor 20 mg oral tablet: Last Dose Taken:  , 1 tab(s) orally once a day  · 	traMADol 100 mg oral tablet: Last Dose Taken:  , 1 tab(s) orally 2 times a day  REVIEW OF SYSTEMS:    As above , all other systems are reviewed and are negative .     MEDICATIONS  (STANDING):  ALPRAZolam 1 milliGRAM(s) Oral daily  atorvastatin 20 milliGRAM(s) Oral at bedtime  celecoxib 200 milliGRAM(s) Oral two times a day  dextrose 5%. 1000 milliLiter(s) (50 mL/Hr) IV Continuous <Continuous>  dextrose 50% Injectable 12.5 Gram(s) IV Push once  dextrose 50% Injectable 25 Gram(s) IV Push once  dextrose 50% Injectable 25 Gram(s) IV Push once  hydrochlorothiazide 12.5 milliGRAM(s) Oral daily  insulin lispro (HumaLOG) corrective regimen sliding scale   SubCutaneous three times a day before meals  pantoprazole    Tablet 40 milliGRAM(s) Oral before breakfast  sodium chloride 0.9% with potassium chloride 20 mEq/L 1000 milliLiter(s) (75 mL/Hr) IV Continuous <Continuous>  valsartan 160 milliGRAM(s) Oral daily    MEDICATIONS  (PRN):  acetaminophen   Tablet .. 650 milliGRAM(s) Oral every 6 hours PRN Temp greater or equal to 38.5C (101.3F), Mild Pain (1 - 3)  ALBUTerol  90 MICROgram(s) HFA Inhaler - Peds 2 Puff(s) Inhalation every 4 hours PRN Shortness of Breath and/or Wheezing  cyclobenzaprine 10 milliGRAM(s) Oral every 8 hours PRN spasm  dextrose 40% Gel 15 Gram(s) Oral once PRN Blood Glucose LESS THAN 70 milliGRAM(s)/deciliter  glucagon  Injectable 1 milliGRAM(s) IntraMuscular once PRN Glucose LESS THAN 70 milligrams/deciliter  HYDROmorphone  Injectable 1 milliGRAM(s) SubCutaneous every 6 hours PRN Severe Pain (7 - 10)  ondansetron   Disintegrating Tablet 4 milliGRAM(s) Oral every 8 hours PRN Nausea  oxycodone    5 mG/acetaminophen 325 mG 1 Tablet(s) Oral every 4 hours PRN Moderate Pain (4 - 6)  oxycodone    5 mG/acetaminophen 325 mG 2 Tablet(s) Oral every 6 hours PRN Severe Pain (7 - 10)      Vital Signs Last 24 Hrs  T(C): 36.9 (23 Sep 2020 08:04), Max: 36.9 (23 Sep 2020 08:04)  T(F): 98.4 (23 Sep 2020 08:04), Max: 98.4 (23 Sep 2020 08:04)  HR: 71 (23 Sep 2020 08:04) (71 - 101)  BP: 131/90 (23 Sep 2020 08:04) (100/72 - 131/90)  BP(mean): --  RR: 17 (23 Sep 2020 08:04) (11 - 18)  SpO2: 93% (23 Sep 2020 08:04) (91% - 100%)    PHYSICAL EXAM:    GENERAL: NAD, well-groomed, well-developed  HEAD:  Atraumatic, Normocephalic  EYES: EOMI, PERRLA, conjunctiva and sclera clear  NECK: Supple, No JVD, Normal thyroid  NERVOUS SYSTEM:  Alert & Oriented X3, no focal deficit   CHEST/LUNG: CTA  b/l,  no rales, rhonchi, wheezing, or rubs  HEART: Regular rate and rhythm; No murmurs, rubs, or gallops  ABDOMEN: Soft, Nontender, Nondistended; Bowel sounds present  EXTREMITIES:  2+ Peripheral Pulses, No clubbing, cyanosis, or edema ,   LYMPH: No lymphadenopathy noted  SKIN: No rashes or lesions  Low back dressing + , clean and dry     LABS:                        12.4   13.19 )-----------( 165      ( 23 Sep 2020 05:40 )             36.0     09-23    133<L>  |  97<L>  |  18.0  ----------------------------<  140<H>  4.3   |  22.0  |  0.90    Ca    8.8      23 Sep 2020 05:40

## 2020-09-23 NOTE — CONSULT NOTE ADULT - ASSESSMENT
54 year old male with a pmhx of HTN, HLD, DM Type 2, panic attacks, disc displacement with nerve compression L-L5-S1.  The patient states he sustained an injury while working as an  and fell through a flight of steps in January 2015. He has been managing conservatively with pain medications (Tramadol 100mg twice daily, Celebrex 200mg twice a day).  He has received epidural injections in the past, however suffered a spinal cord leak and has discontinued since.  He follows with pain management Dr. Rodas.  He states that the pain is sharp 9/10, radiates down his right leg + numbness and tingling, worse with standing and sitting, relieved with laying down. He reports a weak urinary stream but denies bowel or bladder incontinence. Patient is s/p  lumbar laminotomy and foraminotomy of   L4-L5, L5-S1 .

## 2020-09-23 NOTE — CONSULT NOTE ADULT - PROBLEM SELECTOR RECOMMENDATION 9
/ lumbar stenosis L4/5 - L5/S1 with RLE radiculopathy - s/p lumbar laminectomy and foraminotomy ,   PT/OT/pain mgmt  DVT prophylaxis- as per ortho  Abx as per SCIP- given   Incentive spirometry  Prophylaxis of opioid  induced constipation.

## 2020-09-23 NOTE — PROGRESS NOTE ADULT - SUBJECTIVE AND OBJECTIVE BOX
PAtient seen today 9/23/2020 POD#1 s/p right laminotomy L4-5,L5S1. Reports incisional back pain, improved right lower extremity radicular pain. AVSS. Dressing intact. Moves bilateral lower extremities to command with good strength.  Plan: D/c home today.

## 2020-09-23 NOTE — PROGRESS NOTE ADULT - SUBJECTIVE AND OBJECTIVE BOX
pt s/p L4-L5, L5-S1 lami/discectomy pod #1.  Pt doing well OOB with PT.  Pain is controlled with meds  denies nausea, vomiting, sob, CP.      Vital Signs Last 24 Hrs  T(C): 36.9 (23 Sep 2020 08:04), Max: 36.9 (23 Sep 2020 08:04)  T(F): 98.4 (23 Sep 2020 08:04), Max: 98.4 (23 Sep 2020 08:04)  HR: 71 (23 Sep 2020 08:04) (71 - 101)  BP: 131/90 (23 Sep 2020 08:04) (100/72 - 131/90)  BP(mean): --  RR: 17 (23 Sep 2020 08:04) (11 - 18)  SpO2: 93% (23 Sep 2020 08:04) (91% - 100%)    Pt sitting in chair at bedside NAD  lumbar dressing with bloody staining midincision  dressing removed sutures in place no active draining/discharge  New dressing placed  LE MS 5/5  sensation intact, pulses palp    A/P   s/p L4-S1 lami/discectomy pod #1  WBAT  PT  pain control  ortho stable for d/c

## 2021-06-10 PROBLEM — I10 ESSENTIAL (PRIMARY) HYPERTENSION: Chronic | Status: ACTIVE | Noted: 2020-09-04

## 2021-06-10 PROBLEM — F41.0 PANIC DISORDER [EPISODIC PAROXYSMAL ANXIETY]: Chronic | Status: ACTIVE | Noted: 2020-09-04

## 2021-06-10 PROBLEM — S83.209A UNSPECIFIED TEAR OF UNSPECIFIED MENISCUS, CURRENT INJURY, UNSPECIFIED KNEE, INITIAL ENCOUNTER: Chronic | Status: ACTIVE | Noted: 2020-09-04

## 2021-06-10 PROBLEM — M51.26 OTHER INTERVERTEBRAL DISC DISPLACEMENT, LUMBAR REGION: Chronic | Status: ACTIVE | Noted: 2020-09-04

## 2021-06-10 PROBLEM — E78.5 HYPERLIPIDEMIA, UNSPECIFIED: Chronic | Status: ACTIVE | Noted: 2020-09-04

## 2021-06-10 PROBLEM — E11.9 TYPE 2 DIABETES MELLITUS WITHOUT COMPLICATIONS: Chronic | Status: ACTIVE | Noted: 2020-09-04

## 2021-06-10 PROBLEM — J45.909 UNSPECIFIED ASTHMA, UNCOMPLICATED: Chronic | Status: ACTIVE | Noted: 2020-09-04

## 2021-06-17 ENCOUNTER — OUTPATIENT (OUTPATIENT)
Dept: OUTPATIENT SERVICES | Facility: HOSPITAL | Age: 56
LOS: 1 days | End: 2021-06-17
Payer: COMMERCIAL

## 2021-06-17 VITALS
SYSTOLIC BLOOD PRESSURE: 140 MMHG | DIASTOLIC BLOOD PRESSURE: 80 MMHG | HEIGHT: 71 IN | HEART RATE: 83 BPM | TEMPERATURE: 97 F | RESPIRATION RATE: 16 BRPM | WEIGHT: 248.02 LBS

## 2021-06-17 DIAGNOSIS — I10 ESSENTIAL (PRIMARY) HYPERTENSION: ICD-10-CM

## 2021-06-17 DIAGNOSIS — Z98.890 OTHER SPECIFIED POSTPROCEDURAL STATES: Chronic | ICD-10-CM

## 2021-06-17 DIAGNOSIS — S82.899A OTHER FRACTURE OF UNSPECIFIED LOWER LEG, INITIAL ENCOUNTER FOR CLOSED FRACTURE: Chronic | ICD-10-CM

## 2021-06-17 DIAGNOSIS — M50.20 OTHER CERVICAL DISC DISPLACEMENT, UNSPECIFIED CERVICAL REGION: ICD-10-CM

## 2021-06-17 DIAGNOSIS — Z01.818 ENCOUNTER FOR OTHER PREPROCEDURAL EXAMINATION: ICD-10-CM

## 2021-06-17 DIAGNOSIS — Z29.9 ENCOUNTER FOR PROPHYLACTIC MEASURES, UNSPECIFIED: ICD-10-CM

## 2021-06-17 DIAGNOSIS — E11.9 TYPE 2 DIABETES MELLITUS WITHOUT COMPLICATIONS: ICD-10-CM

## 2021-06-17 LAB
A1C WITH ESTIMATED AVERAGE GLUCOSE RESULT: 6.5 % — HIGH (ref 4–5.6)
ANION GAP SERPL CALC-SCNC: 12 MMOL/L — SIGNIFICANT CHANGE UP (ref 5–17)
APTT BLD: 30.2 SEC — SIGNIFICANT CHANGE UP (ref 27.5–35.5)
BASOPHILS # BLD AUTO: 0.04 K/UL — SIGNIFICANT CHANGE UP (ref 0–0.2)
BASOPHILS NFR BLD AUTO: 0.4 % — SIGNIFICANT CHANGE UP (ref 0–2)
BLD GP AB SCN SERPL QL: SIGNIFICANT CHANGE UP
BUN SERPL-MCNC: 27.1 MG/DL — HIGH (ref 8–20)
CALCIUM SERPL-MCNC: 9.8 MG/DL — SIGNIFICANT CHANGE UP (ref 8.6–10.2)
CHLORIDE SERPL-SCNC: 108 MMOL/L — HIGH (ref 98–107)
CO2 SERPL-SCNC: 20 MMOL/L — LOW (ref 22–29)
CREAT SERPL-MCNC: 1.11 MG/DL — SIGNIFICANT CHANGE UP (ref 0.5–1.3)
EOSINOPHIL # BLD AUTO: 0.22 K/UL — SIGNIFICANT CHANGE UP (ref 0–0.5)
EOSINOPHIL NFR BLD AUTO: 2.2 % — SIGNIFICANT CHANGE UP (ref 0–6)
ESTIMATED AVERAGE GLUCOSE: 140 MG/DL — HIGH (ref 68–114)
GLUCOSE SERPL-MCNC: 110 MG/DL — HIGH (ref 70–99)
HCT VFR BLD CALC: 38.7 % — LOW (ref 39–50)
HGB BLD-MCNC: 13.2 G/DL — SIGNIFICANT CHANGE UP (ref 13–17)
IMM GRANULOCYTES NFR BLD AUTO: 0.4 % — SIGNIFICANT CHANGE UP (ref 0–1.5)
INR BLD: 1 RATIO — SIGNIFICANT CHANGE UP (ref 0.88–1.16)
LYMPHOCYTES # BLD AUTO: 2.11 K/UL — SIGNIFICANT CHANGE UP (ref 1–3.3)
LYMPHOCYTES # BLD AUTO: 20.7 % — SIGNIFICANT CHANGE UP (ref 13–44)
MCHC RBC-ENTMCNC: 30.7 PG — SIGNIFICANT CHANGE UP (ref 27–34)
MCHC RBC-ENTMCNC: 34.1 GM/DL — SIGNIFICANT CHANGE UP (ref 32–36)
MCV RBC AUTO: 90 FL — SIGNIFICANT CHANGE UP (ref 80–100)
MONOCYTES # BLD AUTO: 1.39 K/UL — HIGH (ref 0–0.9)
MONOCYTES NFR BLD AUTO: 13.7 % — SIGNIFICANT CHANGE UP (ref 2–14)
MRSA PCR RESULT.: SIGNIFICANT CHANGE UP
NEUTROPHILS # BLD AUTO: 6.37 K/UL — SIGNIFICANT CHANGE UP (ref 1.8–7.4)
NEUTROPHILS NFR BLD AUTO: 62.6 % — SIGNIFICANT CHANGE UP (ref 43–77)
PLATELET # BLD AUTO: 176 K/UL — SIGNIFICANT CHANGE UP (ref 150–400)
POTASSIUM SERPL-MCNC: 4.5 MMOL/L — SIGNIFICANT CHANGE UP (ref 3.5–5.3)
POTASSIUM SERPL-SCNC: 4.5 MMOL/L — SIGNIFICANT CHANGE UP (ref 3.5–5.3)
PROTHROM AB SERPL-ACNC: 11.6 SEC — SIGNIFICANT CHANGE UP (ref 10.6–13.6)
RBC # BLD: 4.3 M/UL — SIGNIFICANT CHANGE UP (ref 4.2–5.8)
RBC # FLD: 12.1 % — SIGNIFICANT CHANGE UP (ref 10.3–14.5)
S AUREUS DNA NOSE QL NAA+PROBE: DETECTED
SODIUM SERPL-SCNC: 140 MMOL/L — SIGNIFICANT CHANGE UP (ref 135–145)
WBC # BLD: 10.17 K/UL — SIGNIFICANT CHANGE UP (ref 3.8–10.5)
WBC # FLD AUTO: 10.17 K/UL — SIGNIFICANT CHANGE UP (ref 3.8–10.5)

## 2021-06-17 PROCEDURE — G0463: CPT

## 2021-06-17 PROCEDURE — 93010 ELECTROCARDIOGRAM REPORT: CPT

## 2021-06-17 PROCEDURE — 93005 ELECTROCARDIOGRAM TRACING: CPT

## 2021-06-17 RX ORDER — SODIUM CHLORIDE 9 MG/ML
3 INJECTION INTRAMUSCULAR; INTRAVENOUS; SUBCUTANEOUS EVERY 8 HOURS
Refills: 0 | Status: DISCONTINUED | OUTPATIENT
Start: 2021-07-06 | End: 2021-07-07

## 2021-06-17 RX ORDER — ICOSAPENT ETHYL 500 MG/1
0 CAPSULE, LIQUID FILLED ORAL
Qty: 0 | Refills: 0 | DISCHARGE

## 2021-06-17 RX ORDER — ALPRAZOLAM 0.25 MG
0 TABLET ORAL
Qty: 0 | Refills: 0 | DISCHARGE

## 2021-06-17 NOTE — H&P PST ADULT - NSICDXPROBLEM_GEN_ALL_CORE_FT
PROBLEM DIAGNOSES  Problem: Disc displacement, cervical  Assessment and Plan: Anterior cervical discectomy fusion and instrumentation C5-C7 f/u with PCP for evaluation and medical clearance for surgery     Problem: Need for prophylactic measure  Assessment and Plan: Moderate risk surgical team to determine prophylactic intervention     Problem: DM (diabetes mellitus)  Assessment and Plan: F/u with PCP for routine management     Problem: Hypertension  Assessment and Plan: f/u with PCP for routine management

## 2021-06-17 NOTE — ASU PATIENT PROFILE, ADULT - NSCAFFEAMTFREQ_GEN_ALL_CORE_SD
presents for psych evaluation. "Im not taking my medications, I cut myself yesterday" Hx of schizophrenia
occasional use

## 2021-06-17 NOTE — H&P PST ADULT - HISTORY OF PRESENT ILLNESS
56 y/o male seen today pre-op for anterior cervical discectomy fusion and instrumentation C5-C7 for cervical disc displacement  56 y/o male pt of size with HTN, DM, HLD, chronic back pain, seen today pre-op for anterior cervical discectomy fusion and instrumentation C5-C7 for cervical disc displacement. Pt report back trauma from a fall at work in 2014. Pt s/p laminotomy and partial facetectomy L4-L5  September 2020. Report cervical spine pain has progressively gotten worse within the past months, Report neck stiffness neck pain worsening with mobility, ADLs, rate daily maximum pain with activity as 8 to 9/10 and at rest 3 to 4/10, cervical spine pain radiates to bilateral upper extremities, bilateral upper extremities numbness and tingling worse on his left arm. Pt requires occasional use of a cane to assist with mobility,  denies any change in bladder and bowel pattern.  Pt report prior conservative treatment of physical and steroid injection with mild relieved. Seen today for a scheduled surgery on 7/6/21 with Dr. Justice Norton .

## 2021-06-17 NOTE — H&P PST ADULT - LAB RESULTS AND INTERPRETATION
6/18/21: Pt lab reviewed, positive MSSA, treatment sent to pharmacy on file. Pt called and provided with treatment instructions. Copies of lab results faxed to PCP office and email sent to Dr. Justice Norton regarding abnormal lab results and surgical coordinator Sushma Gutiérrez.

## 2021-06-17 NOTE — ASU PATIENT PROFILE, ADULT - PMH
Asthma    Disc displacement, lumbar    HLD (hyperlipidemia)    Hypertension    Other cervical disc displacement, unspecified cervical region    Panic attacks    Tear meniscus knee  Left  Type 2 diabetes mellitus

## 2021-06-17 NOTE — H&P PST ADULT - ASSESSMENT
56 y/o male pt of size with HTN, DM, HLD, chronic back pain, seen today pre-op for anterior cervical discectomy fusion and instrumentation C5-C7 for cervical disc displacement. Pt report back trauma from a fall at work in . Pt s/p laminotomy and partial facetectomy L4-L5  2020. Report cervical spine pain has progressively gotten worse within the past months, Report neck stiffness neck pain worsening with mobility, ADLs, rate daily maximum pain with activity as 8 to 9/10 and at rest 3 to 4/10, cervical spine pain radiates to bilateral upper extremities, bilateral upper extremities numbness and tingling worse on his left arm. Pt requires occasional use of a cane to assist with mobility,  denies any change in bladder and bowel pattern.  Pt report prior conservative treatment of physical and steroid injection with mild relieved. Seen today for a scheduled surgery on 21 with Dr. Justice Norton. Surgery protocol reviewed with pt today. Pt to follow-up with PCP for medical clearance   CAPRINI VTE 2.0 SCORE [CLOT updated 2019]    AGE RELATED RISK FACTORS                                                       MOBILITY RELATED FACTORS  [ x] Age 41-60 years                                            (1 Point)                    [ ] Bed rest                                                        (1 Point)  [ ] Age: 61-74 years                                           (2 Points)                  [ ] Plaster cast                                                   (2 Points)  [ ] Age= 75 years                                              (3 Points)                    [ ] Bed bound for more than 72 hours                 (2 Points)    DISEASE RELATED RISK FACTORS                                               GENDER SPECIFIC FACTORS  [ ] Edema in the lower extremities                       (1 Point)              [ ] Pregnancy                                                     (1 Point)  [ ] Varicose veins                                               (1 Point)                     [ ] Post-partum < 6 weeks                                   (1 Point)             [x ] BMI > 25 Kg/m2                                            (1 Point)                     [ ] Hormonal therapy  or oral contraception          (1 Point)                 [ ] Sepsis (in the previous month)                        (1 Point)               [ ] History of pregnancy complications                 (1 point)  [ ] Pneumonia or serious lung disease                                               [ ] Unexplained or recurrent                     (1 Point)           (in the previous month)                               (1 Point)  [ ] Abnormal pulmonary function test                     (1 Point)                 SURGERY RELATED RISK FACTORS  [ ] Acute myocardial infarction                              (1 Point)               [ ]  Section                                             (1 Point)  [ ] Congestive heart failure (in the previous month)  (1 Point)      [ ] Minor surgery                                                  (1 Point)   [ ] Inflammatory bowel disease                             (1 Point)               [ ] Arthroscopic surgery                                        (2 Points)  [ ] Central venous access                                      (2 Points)                [ ] General surgery lasting more than 45 minutes (2 points)  [ ] Malignancy- Present or previous                   (2 Points)                [ x] Elective arthroplasty                                         (5 points)    [ ] Stroke (in the previous month)                          (5 Points)                                                                                                                                                           HEMATOLOGY RELATED FACTORS                                                 TRAUMA RELATED RISK FACTORS  [ ] Prior episodes of VTE                                     (3 Points)                [ ] Fracture of the hip, pelvis, or leg                       (5 Points)  [ ] Positive family history for VTE                         (3 Points)             [ ] Acute spinal cord injury (in the previous month)  (5 Points)  [ ] Prothrombin 38389 A                                     (3 Points)               [ ] Paralysis  (less than 1 month)                             (5 Points)  [ ] Factor V Leiden                                             (3 Points)                  [ ] Multiple Trauma within 1 month                        (5 Points)  [ ] Lupus anticoagulants                                     (3 Points)                                                           [ ] Anticardiolipin antibodies                               (3 Points)                                                       [ ] High homocysteine in the blood                      (3 Points)                                             [ ] Other congenital or acquired thrombophilia      (3 Points)                                                [ ] Heparin induced thrombocytopenia                  (3 Points)                                     Total Score [     7     ]  OPIOID RISK TOOL    ROCHELLE EACH BOX THAT APPLIES AND ADD TOTALS AT THE END    FAMILY HISTORY OF SUBSTANCE ABUSE                 FEMALE         MALE                                                Alcohol                             [  ]1 pt          [  ]3pts                                               Illegal Durgs                     [  ]2 pts        [  ]3pts                                               Rx Drugs                           [  ]4 pts        [  ]4 pts    PERSONAL HISTORY OF SUBSTANCE ABUSE                                                                                          Alcohol                             [  ]3 pts       [  ]3 pts                                               Illegal Drugs                     [  ]4 pts        [  ]4 pts                                               Rx Drugs                           [  ]5 pts        [  ]5 pts    AGE BETWEEN 16-45 YEARS                                      [  ]1 pt         [  ]1 pt    HISTORY OF PREADOLESCENT   SEXUAL ABUSE                                                             [  ]3 pts        [  ]0pts    PSYCHOLOGICAL DISEASE                     ADD, OCD, Bipolar, Schizophrenia        [  ]2 pts         [  ]2 pts                      Depression                                               [ x ]1 pt           [  ]1 pt           SCORING TOTAL   (add numbers and type here)              (**1*)                                     A score of 3 or lower indicated LOW risk for future opioid abuse  A score of 4 to 7 indicated moderate risk for future opioid abuse  A score of 8 or higher indicates a high risk for opioid abuse

## 2021-06-17 NOTE — H&P PST ADULT - NSICDXPASTSURGICALHX_GEN_ALL_CORE_FT
PAST SURGICAL HISTORY:  Fx ankle surgery to repair 1992    Status post medial meniscal repair Leftx2 2007,2015     PAST SURGICAL HISTORY:  Fx ankle surgery to repair 1992    History of laminectomy     Status post medial meniscal repair Leftx2 2007,2015

## 2021-06-17 NOTE — H&P PST ADULT - NSICDXPASTMEDICALHX_GEN_ALL_CORE_FT
PAST MEDICAL HISTORY:  Asthma     Disc displacement, lumbar     HLD (hyperlipidemia)     Hypertension     Other cervical disc displacement, unspecified cervical region     Panic attacks     Tear meniscus knee Left    Type 2 diabetes mellitus      PAST MEDICAL HISTORY:  Asthma     At risk for sleep apnea     Disc displacement, lumbar     HLD (hyperlipidemia)     Hypertension     Obese BMI 34.6    Other cervical disc displacement, unspecified cervical region     Panic attacks     Tear meniscus knee Left    Type 2 diabetes mellitus

## 2021-06-18 RX ORDER — MUPIROCIN 20 MG/G
1 OINTMENT TOPICAL
Qty: 1 | Refills: 0
Start: 2021-06-18 | End: 2021-06-22

## 2021-07-03 ENCOUNTER — APPOINTMENT (OUTPATIENT)
Dept: DISASTER EMERGENCY | Facility: CLINIC | Age: 56
End: 2021-07-03

## 2021-07-04 LAB — SARS-COV-2 N GENE NPH QL NAA+PROBE: NOT DETECTED

## 2021-07-05 ENCOUNTER — TRANSCRIPTION ENCOUNTER (OUTPATIENT)
Age: 56
End: 2021-07-05

## 2021-07-06 ENCOUNTER — TRANSCRIPTION ENCOUNTER (OUTPATIENT)
Age: 56
End: 2021-07-06

## 2021-07-06 ENCOUNTER — INPATIENT (INPATIENT)
Facility: HOSPITAL | Age: 56
LOS: 0 days | Discharge: HOME | DRG: 473 | End: 2021-07-07
Attending: SPECIALIST | Admitting: SPECIALIST
Payer: COMMERCIAL

## 2021-07-06 VITALS
HEART RATE: 92 BPM | TEMPERATURE: 98 F | RESPIRATION RATE: 18 BRPM | OXYGEN SATURATION: 97 % | HEIGHT: 71 IN | DIASTOLIC BLOOD PRESSURE: 81 MMHG | WEIGHT: 248.02 LBS | SYSTOLIC BLOOD PRESSURE: 128 MMHG

## 2021-07-06 DIAGNOSIS — Z98.890 OTHER SPECIFIED POSTPROCEDURAL STATES: Chronic | ICD-10-CM

## 2021-07-06 DIAGNOSIS — M50.20 OTHER CERVICAL DISC DISPLACEMENT, UNSPECIFIED CERVICAL REGION: ICD-10-CM

## 2021-07-06 DIAGNOSIS — S82.899A OTHER FRACTURE OF UNSPECIFIED LOWER LEG, INITIAL ENCOUNTER FOR CLOSED FRACTURE: Chronic | ICD-10-CM

## 2021-07-06 LAB
GLUCOSE BLDC GLUCOMTR-MCNC: 160 MG/DL — HIGH (ref 70–99)
GLUCOSE BLDC GLUCOMTR-MCNC: 173 MG/DL — HIGH (ref 70–99)
GLUCOSE BLDC GLUCOMTR-MCNC: 201 MG/DL — HIGH (ref 70–99)
GLUCOSE BLDC GLUCOMTR-MCNC: 255 MG/DL — HIGH (ref 70–99)
GLUCOSE BLDC GLUCOMTR-MCNC: 269 MG/DL — HIGH (ref 70–99)

## 2021-07-06 PROCEDURE — 99222 1ST HOSP IP/OBS MODERATE 55: CPT

## 2021-07-06 RX ORDER — GABAPENTIN 400 MG/1
300 CAPSULE ORAL THREE TIMES A DAY
Refills: 0 | Status: DISCONTINUED | OUTPATIENT
Start: 2021-07-06 | End: 2021-07-07

## 2021-07-06 RX ORDER — OXYCODONE HYDROCHLORIDE 5 MG/1
10 TABLET ORAL
Refills: 0 | Status: DISCONTINUED | OUTPATIENT
Start: 2021-07-06 | End: 2021-07-07

## 2021-07-06 RX ORDER — INSULIN LISPRO 100/ML
VIAL (ML) SUBCUTANEOUS
Refills: 0 | Status: DISCONTINUED | OUTPATIENT
Start: 2021-07-06 | End: 2021-07-07

## 2021-07-06 RX ORDER — ESOMEPRAZOLE MAGNESIUM 40 MG/1
1 CAPSULE, DELAYED RELEASE ORAL
Qty: 0 | Refills: 0 | DISCHARGE

## 2021-07-06 RX ORDER — SENNA PLUS 8.6 MG/1
2 TABLET ORAL AT BEDTIME
Refills: 0 | Status: DISCONTINUED | OUTPATIENT
Start: 2021-07-06 | End: 2021-07-07

## 2021-07-06 RX ORDER — GLUCAGON INJECTION, SOLUTION 0.5 MG/.1ML
1 INJECTION, SOLUTION SUBCUTANEOUS ONCE
Refills: 0 | Status: DISCONTINUED | OUTPATIENT
Start: 2021-07-06 | End: 2021-07-07

## 2021-07-06 RX ORDER — CEFAZOLIN SODIUM 1 G
2000 VIAL (EA) INJECTION ONCE
Refills: 0 | Status: DISCONTINUED | OUTPATIENT
Start: 2021-07-06 | End: 2021-07-06

## 2021-07-06 RX ORDER — ALPRAZOLAM 0.25 MG
1 TABLET ORAL DAILY
Refills: 0 | Status: DISCONTINUED | OUTPATIENT
Start: 2021-07-06 | End: 2021-07-07

## 2021-07-06 RX ORDER — ALBUTEROL 90 UG/1
2 AEROSOL, METERED ORAL EVERY 6 HOURS
Refills: 0 | Status: DISCONTINUED | OUTPATIENT
Start: 2021-07-06 | End: 2021-07-07

## 2021-07-06 RX ORDER — HYDROMORPHONE HYDROCHLORIDE 2 MG/ML
0.5 INJECTION INTRAMUSCULAR; INTRAVENOUS; SUBCUTANEOUS ONCE
Refills: 0 | Status: DISCONTINUED | OUTPATIENT
Start: 2021-07-06 | End: 2021-07-06

## 2021-07-06 RX ORDER — SODIUM CHLORIDE 9 MG/ML
1000 INJECTION, SOLUTION INTRAVENOUS
Refills: 0 | Status: DISCONTINUED | OUTPATIENT
Start: 2021-07-06 | End: 2021-07-06

## 2021-07-06 RX ORDER — DEXTROSE 50 % IN WATER 50 %
12.5 SYRINGE (ML) INTRAVENOUS ONCE
Refills: 0 | Status: DISCONTINUED | OUTPATIENT
Start: 2021-07-06 | End: 2021-07-07

## 2021-07-06 RX ORDER — ATORVASTATIN CALCIUM 80 MG/1
20 TABLET, FILM COATED ORAL AT BEDTIME
Refills: 0 | Status: DISCONTINUED | OUTPATIENT
Start: 2021-07-06 | End: 2021-07-07

## 2021-07-06 RX ORDER — ACETAMINOPHEN 500 MG
975 TABLET ORAL EVERY 8 HOURS
Refills: 0 | Status: DISCONTINUED | OUTPATIENT
Start: 2021-07-06 | End: 2021-07-07

## 2021-07-06 RX ORDER — HYDROMORPHONE HYDROCHLORIDE 2 MG/ML
0.5 INJECTION INTRAMUSCULAR; INTRAVENOUS; SUBCUTANEOUS
Refills: 0 | Status: DISCONTINUED | OUTPATIENT
Start: 2021-07-06 | End: 2021-07-06

## 2021-07-06 RX ORDER — METHOCARBAMOL 500 MG/1
750 TABLET, FILM COATED ORAL THREE TIMES A DAY
Refills: 0 | Status: DISCONTINUED | OUTPATIENT
Start: 2021-07-06 | End: 2021-07-07

## 2021-07-06 RX ORDER — ACETAMINOPHEN 500 MG
1000 TABLET ORAL ONCE
Refills: 0 | Status: DISCONTINUED | OUTPATIENT
Start: 2021-07-06 | End: 2021-07-06

## 2021-07-06 RX ORDER — CEFAZOLIN SODIUM 1 G
2000 VIAL (EA) INJECTION ONCE
Refills: 0 | Status: COMPLETED | OUTPATIENT
Start: 2021-07-06 | End: 2021-07-06

## 2021-07-06 RX ORDER — DEXTROSE 50 % IN WATER 50 %
15 SYRINGE (ML) INTRAVENOUS ONCE
Refills: 0 | Status: DISCONTINUED | OUTPATIENT
Start: 2021-07-06 | End: 2021-07-07

## 2021-07-06 RX ORDER — HYDROMORPHONE HYDROCHLORIDE 2 MG/ML
0.5 INJECTION INTRAMUSCULAR; INTRAVENOUS; SUBCUTANEOUS ONCE
Refills: 0 | Status: DISCONTINUED | OUTPATIENT
Start: 2021-07-06 | End: 2021-07-07

## 2021-07-06 RX ORDER — SODIUM CHLORIDE 9 MG/ML
1000 INJECTION, SOLUTION INTRAVENOUS
Refills: 0 | Status: DISCONTINUED | OUTPATIENT
Start: 2021-07-06 | End: 2021-07-07

## 2021-07-06 RX ORDER — ATORVASTATIN CALCIUM 80 MG/1
1 TABLET, FILM COATED ORAL
Qty: 0 | Refills: 0 | DISCHARGE

## 2021-07-06 RX ORDER — OXYCODONE HYDROCHLORIDE 5 MG/1
5 TABLET ORAL
Refills: 0 | Status: DISCONTINUED | OUTPATIENT
Start: 2021-07-06 | End: 2021-07-07

## 2021-07-06 RX ORDER — ACETAMINOPHEN 500 MG
975 TABLET ORAL ONCE
Refills: 0 | Status: COMPLETED | OUTPATIENT
Start: 2021-07-06 | End: 2021-07-06

## 2021-07-06 RX ORDER — SODIUM CHLORIDE 9 MG/ML
1000 INJECTION INTRAMUSCULAR; INTRAVENOUS; SUBCUTANEOUS
Refills: 0 | Status: DISCONTINUED | OUTPATIENT
Start: 2021-07-06 | End: 2021-07-07

## 2021-07-06 RX ORDER — PANTOPRAZOLE SODIUM 20 MG/1
40 TABLET, DELAYED RELEASE ORAL
Refills: 0 | Status: DISCONTINUED | OUTPATIENT
Start: 2021-07-06 | End: 2021-07-07

## 2021-07-06 RX ORDER — CELECOXIB 200 MG/1
200 CAPSULE ORAL
Refills: 0 | Status: DISCONTINUED | OUTPATIENT
Start: 2021-07-06 | End: 2021-07-07

## 2021-07-06 RX ORDER — EXENATIDE 250 UG/ML
0 INJECTION SUBCUTANEOUS
Qty: 0 | Refills: 0 | DISCHARGE

## 2021-07-06 RX ORDER — GABAPENTIN 400 MG/1
0 CAPSULE ORAL
Qty: 0 | Refills: 0 | DISCHARGE

## 2021-07-06 RX ORDER — ALPRAZOLAM 0.25 MG
0 TABLET ORAL
Qty: 0 | Refills: 0 | DISCHARGE

## 2021-07-06 RX ORDER — CEFAZOLIN SODIUM 1 G
2000 VIAL (EA) INJECTION EVERY 8 HOURS
Refills: 0 | Status: DISCONTINUED | OUTPATIENT
Start: 2021-07-06 | End: 2021-07-07

## 2021-07-06 RX ORDER — ONDANSETRON 8 MG/1
4 TABLET, FILM COATED ORAL ONCE
Refills: 0 | Status: DISCONTINUED | OUTPATIENT
Start: 2021-07-06 | End: 2021-07-06

## 2021-07-06 RX ORDER — NICOTINE POLACRILEX 2 MG
1 GUM BUCCAL DAILY
Refills: 0 | Status: DISCONTINUED | OUTPATIENT
Start: 2021-07-06 | End: 2021-07-07

## 2021-07-06 RX ORDER — METFORMIN HYDROCHLORIDE 850 MG/1
0 TABLET ORAL
Qty: 0 | Refills: 0 | DISCHARGE

## 2021-07-06 RX ORDER — ONDANSETRON 8 MG/1
4 TABLET, FILM COATED ORAL EVERY 6 HOURS
Refills: 0 | Status: DISCONTINUED | OUTPATIENT
Start: 2021-07-06 | End: 2021-07-07

## 2021-07-06 RX ORDER — DEXTROSE 50 % IN WATER 50 %
25 SYRINGE (ML) INTRAVENOUS ONCE
Refills: 0 | Status: DISCONTINUED | OUTPATIENT
Start: 2021-07-06 | End: 2021-07-07

## 2021-07-06 RX ORDER — ALBUTEROL 90 UG/1
2 AEROSOL, METERED ORAL
Qty: 0 | Refills: 0 | DISCHARGE

## 2021-07-06 RX ORDER — GABAPENTIN 400 MG/1
300 CAPSULE ORAL ONCE
Refills: 0 | Status: COMPLETED | OUTPATIENT
Start: 2021-07-06 | End: 2021-07-06

## 2021-07-06 RX ADMIN — HYDROMORPHONE HYDROCHLORIDE 0.5 MILLIGRAM(S): 2 INJECTION INTRAMUSCULAR; INTRAVENOUS; SUBCUTANEOUS at 12:15

## 2021-07-06 RX ADMIN — Medication 975 MILLIGRAM(S): at 22:48

## 2021-07-06 RX ADMIN — GABAPENTIN 300 MILLIGRAM(S): 400 CAPSULE ORAL at 16:23

## 2021-07-06 RX ADMIN — HYDROMORPHONE HYDROCHLORIDE 0.5 MILLIGRAM(S): 2 INJECTION INTRAMUSCULAR; INTRAVENOUS; SUBCUTANEOUS at 12:00

## 2021-07-06 RX ADMIN — SODIUM CHLORIDE 75 MILLILITER(S): 9 INJECTION, SOLUTION INTRAVENOUS at 12:00

## 2021-07-06 RX ADMIN — HYDROMORPHONE HYDROCHLORIDE 0.5 MILLIGRAM(S): 2 INJECTION INTRAMUSCULAR; INTRAVENOUS; SUBCUTANEOUS at 12:30

## 2021-07-06 RX ADMIN — CELECOXIB 200 MILLIGRAM(S): 200 CAPSULE ORAL at 18:28

## 2021-07-06 RX ADMIN — ATORVASTATIN CALCIUM 20 MILLIGRAM(S): 80 TABLET, FILM COATED ORAL at 22:48

## 2021-07-06 RX ADMIN — HYDROMORPHONE HYDROCHLORIDE 0.5 MILLIGRAM(S): 2 INJECTION INTRAMUSCULAR; INTRAVENOUS; SUBCUTANEOUS at 11:45

## 2021-07-06 RX ADMIN — HYDROMORPHONE HYDROCHLORIDE 0.5 MILLIGRAM(S): 2 INJECTION INTRAMUSCULAR; INTRAVENOUS; SUBCUTANEOUS at 13:00

## 2021-07-06 RX ADMIN — SENNA PLUS 2 TABLET(S): 8.6 TABLET ORAL at 22:48

## 2021-07-06 RX ADMIN — GABAPENTIN 300 MILLIGRAM(S): 400 CAPSULE ORAL at 22:48

## 2021-07-06 RX ADMIN — Medication 1 PATCH: at 18:28

## 2021-07-06 RX ADMIN — Medication 975 MILLIGRAM(S): at 16:22

## 2021-07-06 RX ADMIN — METHOCARBAMOL 750 MILLIGRAM(S): 500 TABLET, FILM COATED ORAL at 22:48

## 2021-07-06 RX ADMIN — HYDROMORPHONE HYDROCHLORIDE 0.5 MILLIGRAM(S): 2 INJECTION INTRAMUSCULAR; INTRAVENOUS; SUBCUTANEOUS at 13:15

## 2021-07-06 RX ADMIN — Medication 100 MILLIGRAM(S): at 16:30

## 2021-07-06 RX ADMIN — SODIUM CHLORIDE 3 MILLILITER(S): 9 INJECTION INTRAMUSCULAR; INTRAVENOUS; SUBCUTANEOUS at 20:37

## 2021-07-06 RX ADMIN — Medication 975 MILLIGRAM(S): at 08:17

## 2021-07-06 RX ADMIN — METHOCARBAMOL 750 MILLIGRAM(S): 500 TABLET, FILM COATED ORAL at 14:24

## 2021-07-06 RX ADMIN — Medication 1 PATCH: at 19:17

## 2021-07-06 RX ADMIN — Medication: at 15:05

## 2021-07-06 RX ADMIN — Medication 6: at 18:28

## 2021-07-06 RX ADMIN — SODIUM CHLORIDE 100 MILLILITER(S): 9 INJECTION INTRAMUSCULAR; INTRAVENOUS; SUBCUTANEOUS at 18:33

## 2021-07-06 RX ADMIN — CELECOXIB 200 MILLIGRAM(S): 200 CAPSULE ORAL at 18:54

## 2021-07-06 RX ADMIN — HYDROMORPHONE HYDROCHLORIDE 0.5 MILLIGRAM(S): 2 INJECTION INTRAMUSCULAR; INTRAVENOUS; SUBCUTANEOUS at 11:35

## 2021-07-06 RX ADMIN — Medication 100 MILLIGRAM(S): at 22:47

## 2021-07-06 RX ADMIN — GABAPENTIN 300 MILLIGRAM(S): 400 CAPSULE ORAL at 08:17

## 2021-07-06 NOTE — BRIEF OPERATIVE NOTE - OPERATION/FINDINGS
ACDF C5/6  Add'l ACDF C6/7  cage placement C5/6  Add'l cage C6/7  local autograft  Anterior Instrumentation C5-7

## 2021-07-06 NOTE — PHYSICAL THERAPY INITIAL EVALUATION ADULT - ADDITIONAL COMMENTS
Pt lives in a home with no steps to enter and 15 stairs inside. Pt lives alone but brother will be staying with pt. Independent prior to admission. Owns a cane; intermittently used. No other DME. Pt drives & is retired.

## 2021-07-06 NOTE — PHYSICAL THERAPY INITIAL EVALUATION ADULT - GENERAL OBSERVATIONS, REHAB EVAL
Pt received in bed, + IV Loc, + hemovac drain + SCDs, breathing on RA in NAD, 6/10 pain, agreeable to PT evaluation

## 2021-07-06 NOTE — BRIEF OPERATIVE NOTE - NSICDXBRIEFPROCEDURE_GEN_ALL_CORE_FT
PROCEDURES:  Anterior cervical discectomy and fusion (ACDF) at 2 levels 06-Jul-2021 11:29:32  David Barney N

## 2021-07-06 NOTE — CHART NOTE - NSCHARTNOTEFT_GEN_A_CORE
Called by RN that patient reported some SOB/woke up gasping for air.   Patient satting at 94% on room air. No complaints of shortness of breath at this time.  Likely patient with sleep apnea.  2L NC placed on patient.  Continue to monitor patient, notify PA of any changes in patient status.

## 2021-07-06 NOTE — CONSULT NOTE ADULT - ASSESSMENT
54 y/o M with hx of HTN, DM, HLD, chronic back pain, he had back trauma from a fall at work in 2014, he is s/p laminotomy and partial facetectomy L4-L5  September 2020, he has been having cervical spine pain has progressively gotten worse within the past months, associated with neck stiffness, cervical spine pain radiates to bilateral upper extremities, bilateral upper extremities numbness and tingling worse on his left arm, Pt report prior conservative treatment of physical and steroid injection with mild relieved, patient came in here for elective anterior cervical discectomy fusion and instrumentation C5-C7 for cervical disc displacement on 7/6/21 with Dr. Justice Norton, s/p procedure.     Plan:   Cervical disc displacement s/p anterior cervical discectomy fusion and instrumentation C5-C7 post op day 0:     Pain meds and DVT prophylaxis as per Ortho spine service  Bowel meds  IV fluids  Monitor CBC  PT eval.   will monitor vitals if hypotensive will give bolus of fluids.     HTN: will continue with his valsartan-hydrochlorothiazide 160mg-12.5mg once a day    GERD: continue NexIUM 40 mg once a day    HLD: continue with Lipitor 20 mg once a day.     DM: will hold metformin will do FS and coverage insulin    Hx of asthma: Ventolin inhalor as needed      54 y/o M with hx of HTN, DM, HLD, chronic back pain, he had back trauma from a fall at work in 2014, he is s/p laminotomy and partial facetectomy L4-L5  September 2020, he has been having cervical spine pain has progressively gotten worse within the past months, associated with neck stiffness, cervical spine pain radiates to bilateral upper extremities, bilateral upper extremities numbness and tingling worse on his left arm, Pt report prior conservative treatment of physical and steroid injection with mild relieved, patient came in here for elective anterior cervical discectomy fusion and instrumentation C5-C7 for cervical disc displacement on 7/6/21 with Dr. Justice Norton, s/p procedure.     Plan:   Cervical disc displacement s/p anterior cervical discectomy fusion and instrumentation C5-C7 post op day 0:     Pain meds and DVT prophylaxis as per Ortho spine service  Bowel meds  IV fluids  Monitor CBC  PT eval.   will monitor vitals if hypotensive will give bolus of fluids.     HTN: will continue with his valsartan-hydrochlorothiazide 160mg-12.5mg once a day with holding parameters.     GERD: continue NexIUM 40 mg once a day    HLD: continue with Lipitor 20 mg once a day.     DM: will hold metformin will do FS and coverage insulin    Hx of asthma: Ventolin inhalor as needed

## 2021-07-06 NOTE — DISCHARGE NOTE PROVIDER - CARE PROVIDER_API CALL
Errol Rendon  Orthopedic Surgery  [address]  [place] [id number]  Phone: [phone]  Fax: [phone]  Follow Up Time:

## 2021-07-06 NOTE — DISCHARGE NOTE PROVIDER - NSDCMRMEDTOKEN_GEN_ALL_CORE_FT
Bydureon Pen 2 mg subcutaneous injection, extended release: q Monday  CeleBREX 200 mg oral capsule: orally 2 times a day  gabapentin 300 mg oral tablet: orally 3 times a day  Lipitor 20 mg oral tablet: 1 tab(s) orally once a day  metFORMIN 500 mg oral tablet, extended release: orally 2 times a day  mupirocin 2% topical ointment: Apply to both nostrils 2 times a day for 5 days . start on 7/1 to 7/5/21. Do not apply the morning of COVID Test   NexIUM 40 mg oral delayed release capsule: 1 cap(s) orally once a day  valsartan-hydrochlorothiazide 160mg-12.5mg oral tablet: 1 tab(s) orally once a day  Ventolin HFA 90 mcg/inh inhalation aerosol: 2 puff(s) inhaled every 6 hours  Xanax 1 mg oral tablet: orally prn   acetaminophen 325 mg oral tablet: 3 tab(s) orally every 8 hours  Bydureon Pen 2 mg subcutaneous injection, extended release: q Monday  gabapentin 300 mg oral tablet: orally 3 times a day  Lipitor 20 mg oral tablet: 1 tab(s) orally once a day  metFORMIN 500 mg oral tablet, extended release: orally 2 times a day  NexIUM 40 mg oral delayed release capsule: 1 cap(s) orally once a day  valsartan-hydrochlorothiazide 160mg-12.5mg oral tablet: 1 tab(s) orally once a day  Ventolin HFA 90 mcg/inh inhalation aerosol: 2 puff(s) inhaled every 6 hours  Xanax 1 mg oral tablet: orally prn

## 2021-07-06 NOTE — DISCHARGE NOTE PROVIDER - HOSPITAL COURSE
patient was admitted for elective anterior cervical discectomy and fusion. The post operative course was uneventful.  PT/OT Worked with patient for acute rehabilitation process. The pain was adequately controlled and patient is able to go home as she can accomplish ADL with help from family member at this time. The drain was removed without complication. The cervical collar was worn for comfort when out of bed.

## 2021-07-06 NOTE — DISCHARGE NOTE PROVIDER - NSDCCPTREATMENT_GEN_ALL_CORE_FT
PRINCIPAL PROCEDURE  Procedure: Anterior cervical discectomy and fusion (ACDF) at 2 levels  Findings and Treatment:

## 2021-07-06 NOTE — DISCHARGE NOTE PROVIDER - NSDCCPCAREPLAN_GEN_ALL_CORE_FT
PRINCIPAL DISCHARGE DIAGNOSIS  Diagnosis: Disc displacement, cervical  Assessment and Plan of Treatment:

## 2021-07-06 NOTE — DISCHARGE NOTE PROVIDER - NSDCFUADDINST_GEN_ALL_CORE_FT
Leave occlusive dressing on wound for one week. You may then remove it and leave open to air. Protect while showering.  Leave steri strips intact, they will fall off on their own or be removed on first post op visit. Wear cervical collar when out of bed for comfort, especially when you are passenger in a car , may take off for meals & showering.  Physical therapy will be prescribed on second office visit.  For now, engage in light activity as tolerated, no lifting greater than 5 lb.  No driving while on pain meds and must wear cervical collar when in a vehicle for 28 days.  Use the Valium for neck spasms as needed.  Leave occlusive dressing on wound for one week. You may then remove it and leave open to air. Protect while showering.  Leave steri strips intact, they will fall off on their own or be removed on first post op visit. Wear cervical collar when out of bed for comfort, especially when you are passenger in a car , may take off for meals & showering.  Physical therapy will be prescribed on second office visit.  For now, engage in light activity as tolerated, no lifting greater than 5 lb.  No driving while on pain meds and must wear cervical collar when in a vehicle for 28 days.

## 2021-07-06 NOTE — CONSULT NOTE ADULT - SUBJECTIVE AND OBJECTIVE BOX
56 y/o M with hx of HTN, DM, HLD, chronic back pain, he had back trauma from a fall at work in 2014, he is s/p laminotomy and partial facetectomy L4-L5  September 2020, he has been having cervical spine pain has progressively gotten worse within the past months, associated with neck stiffness, cervical spine pain radiates to bilateral upper extremities, bilateral upper extremities numbness and tingling worse on his left arm, Pt report prior conservative treatment of physical and steroid injection with mild relieved, patient came in here for elective anterior cervical discectomy fusion and instrumentation C5-C7 for cervical disc displacement on 7/6/21 with Dr. Justice Norton, s/p procedure, Patient tolerated procedure well, has some neck pain, he has no chest pain, sob, dizziness, fever, chills, nausea, vomiting.      Allergies:  	No Known Allergies:     Home Medications:   * Incomplete Medication History as of 17-Jun-2021 13:08 documented in Structured Notes  · 	Bydureon Pen 2 mg subcutaneous injection, extended release: Last Dose Taken:  , q Monday  · 	Ventolin HFA 90 mcg/inh inhalation aerosol: Last Dose Taken:  , 2 puff(s) inhaled every 6 hours  · 	CeleBREX 200 mg oral capsule: Last Dose Taken:  , orally 2 times a day  · 	metFORMIN 500 mg oral tablet, extended release: Last Dose Taken:  , orally 2 times a day  · 	valsartan-hydrochlorothiazide 160mg-12.5mg oral tablet: Last Dose Taken:  , 1 tab(s) orally once a day  · 	NexIUM 40 mg oral delayed release capsule: Last Dose Taken:  , 1 cap(s) orally once a day  · 	Lipitor 20 mg oral tablet: Last Dose Taken:  , 1 tab(s) orally once a day  · 	Xanax 1 mg oral tablet: Last Dose Taken:  , orally prn  · 	gabapentin 300 mg oral tablet: Last Dose Taken:  , orally 3 times a day      PAST MEDICAL HISTORY:  Asthma     At risk for sleep apnea     Disc displacement, lumbar     HLD (hyperlipidemia)     Hypertension     Obese BMI 34.6    Other cervical disc displacement, unspecified cervical region     Panic attacks     Tear meniscus knee Left    Type 2 diabetes mellitus.     PAST SURGICAL HISTORY:  Fx ankle surgery to repair 1992    History of laminectomy     Status post medial meniscal repair Leftx2 2007,2015.     FAMILY HISTORY:  Father  Still living? Unknown  Family history of diabetes mellitus (DM), Age at diagnosis: Age Unknown    Mother  Still living? Unknown  FH: esophageal cancer, Age at diagnosis: Age Unknown    Grandparent  Still living? Unknown  FH: prostate cancer, Age at diagnosis: Age Unknown.     Social History: Not a smoker, drinker or using any drugs    Vital Signs Last 24 Hrs  T(C): 36.4 (06 Jul 2021 14:30), Max: 36.7 (06 Jul 2021 11:35)  T(F): 97.5 (06 Jul 2021 14:30), Max: 98.1 (06 Jul 2021 11:35)  HR: 89 (06 Jul 2021 15:30) (88 - 100)  BP: 132/84 (06 Jul 2021 15:30) (108/87 - 139/77)  BP(mean): --  RR: 15 (06 Jul 2021 15:30) (14 - 18)  SpO2: 96% (06 Jul 2021 15:30) (94% - 98%)    PHYSICAL EXAM:    GENERAL: Middle age male looking comfortable   HEENT: PERRL, +EOMI  NECK: Surgical dressing on anterior aspect of the neck with drain in place   CHEST/LUNG: Clear to auscultate bilaterally; No wheezing  HEART: S1S2+, Regular rate and rhythm; No murmurs  ABDOMEN: Soft, Nontender, Nondistended; Bowel sounds present  EXTREMITIES:  2+ Peripheral Pulses, No edema  SKIN: No rashes or lesions  NEURO: AAOX3, no focal deficits, no motor r sensory loss  PSYCH: normal mood

## 2021-07-07 ENCOUNTER — TRANSCRIPTION ENCOUNTER (OUTPATIENT)
Age: 56
End: 2021-07-07

## 2021-07-07 VITALS
RESPIRATION RATE: 18 BRPM | SYSTOLIC BLOOD PRESSURE: 141 MMHG | OXYGEN SATURATION: 94 % | HEART RATE: 80 BPM | TEMPERATURE: 98 F | DIASTOLIC BLOOD PRESSURE: 91 MMHG

## 2021-07-07 LAB
ANION GAP SERPL CALC-SCNC: 13 MMOL/L — SIGNIFICANT CHANGE UP (ref 5–17)
BUN SERPL-MCNC: 20 MG/DL — SIGNIFICANT CHANGE UP (ref 8–20)
CALCIUM SERPL-MCNC: 9.1 MG/DL — SIGNIFICANT CHANGE UP (ref 8.6–10.2)
CHLORIDE SERPL-SCNC: 101 MMOL/L — SIGNIFICANT CHANGE UP (ref 98–107)
CO2 SERPL-SCNC: 22 MMOL/L — SIGNIFICANT CHANGE UP (ref 22–29)
COVID-19 SPIKE DOMAIN AB INTERP: NEGATIVE — SIGNIFICANT CHANGE UP
COVID-19 SPIKE DOMAIN ANTIBODY RESULT: 0.4 U/ML — SIGNIFICANT CHANGE UP
CREAT SERPL-MCNC: 0.89 MG/DL — SIGNIFICANT CHANGE UP (ref 0.5–1.3)
GLUCOSE BLDC GLUCOMTR-MCNC: 155 MG/DL — HIGH (ref 70–99)
GLUCOSE BLDC GLUCOMTR-MCNC: 201 MG/DL — HIGH (ref 70–99)
GLUCOSE SERPL-MCNC: 155 MG/DL — HIGH (ref 70–99)
HCT VFR BLD CALC: 33.2 % — LOW (ref 39–50)
HGB BLD-MCNC: 11.4 G/DL — LOW (ref 13–17)
MCHC RBC-ENTMCNC: 31 PG — SIGNIFICANT CHANGE UP (ref 27–34)
MCHC RBC-ENTMCNC: 34.3 GM/DL — SIGNIFICANT CHANGE UP (ref 32–36)
MCV RBC AUTO: 90.2 FL — SIGNIFICANT CHANGE UP (ref 80–100)
PLATELET # BLD AUTO: 147 K/UL — LOW (ref 150–400)
POTASSIUM SERPL-MCNC: 4.2 MMOL/L — SIGNIFICANT CHANGE UP (ref 3.5–5.3)
POTASSIUM SERPL-SCNC: 4.2 MMOL/L — SIGNIFICANT CHANGE UP (ref 3.5–5.3)
RBC # BLD: 3.68 M/UL — LOW (ref 4.2–5.8)
RBC # FLD: 12.4 % — SIGNIFICANT CHANGE UP (ref 10.3–14.5)
SARS-COV-2 IGG+IGM SERPL QL IA: 0.4 U/ML — SIGNIFICANT CHANGE UP
SARS-COV-2 IGG+IGM SERPL QL IA: NEGATIVE — SIGNIFICANT CHANGE UP
SODIUM SERPL-SCNC: 136 MMOL/L — SIGNIFICANT CHANGE UP (ref 135–145)
WBC # BLD: 13.99 K/UL — HIGH (ref 3.8–10.5)
WBC # FLD AUTO: 13.99 K/UL — HIGH (ref 3.8–10.5)

## 2021-07-07 PROCEDURE — 86769 SARS-COV-2 COVID-19 ANTIBODY: CPT

## 2021-07-07 PROCEDURE — 82962 GLUCOSE BLOOD TEST: CPT

## 2021-07-07 PROCEDURE — 97163 PT EVAL HIGH COMPLEX 45 MIN: CPT

## 2021-07-07 PROCEDURE — 80048 BASIC METABOLIC PNL TOTAL CA: CPT

## 2021-07-07 PROCEDURE — 99232 SBSQ HOSP IP/OBS MODERATE 35: CPT

## 2021-07-07 PROCEDURE — C1713: CPT

## 2021-07-07 PROCEDURE — 36415 COLL VENOUS BLD VENIPUNCTURE: CPT

## 2021-07-07 PROCEDURE — 76000 FLUOROSCOPY <1 HR PHYS/QHP: CPT

## 2021-07-07 PROCEDURE — C1889: CPT

## 2021-07-07 PROCEDURE — 85027 COMPLETE CBC AUTOMATED: CPT

## 2021-07-07 RX ORDER — CELECOXIB 200 MG/1
0 CAPSULE ORAL
Qty: 0 | Refills: 0 | DISCHARGE

## 2021-07-07 RX ORDER — METHOCARBAMOL 500 MG/1
2 TABLET, FILM COATED ORAL
Qty: 18 | Refills: 0
Start: 2021-07-07 | End: 2021-07-09

## 2021-07-07 RX ORDER — ACETAMINOPHEN 500 MG
3 TABLET ORAL
Qty: 0 | Refills: 0 | DISCHARGE
Start: 2021-07-07

## 2021-07-07 RX ORDER — OXYCODONE HYDROCHLORIDE 5 MG/1
1 TABLET ORAL
Qty: 42 | Refills: 0
Start: 2021-07-07 | End: 2021-07-13

## 2021-07-07 RX ORDER — SENNOSIDES/DOCUSATE SODIUM 8.6MG-50MG
2 TABLET ORAL
Qty: 14 | Refills: 0
Start: 2021-07-07 | End: 2021-07-13

## 2021-07-07 RX ADMIN — SODIUM CHLORIDE 100 MILLILITER(S): 9 INJECTION INTRAMUSCULAR; INTRAVENOUS; SUBCUTANEOUS at 06:02

## 2021-07-07 RX ADMIN — CELECOXIB 200 MILLIGRAM(S): 200 CAPSULE ORAL at 07:40

## 2021-07-07 RX ADMIN — METHOCARBAMOL 750 MILLIGRAM(S): 500 TABLET, FILM COATED ORAL at 06:02

## 2021-07-07 RX ADMIN — Medication 975 MILLIGRAM(S): at 06:01

## 2021-07-07 RX ADMIN — Medication 1 PATCH: at 06:03

## 2021-07-07 RX ADMIN — Medication 100 MILLIGRAM(S): at 06:01

## 2021-07-07 RX ADMIN — PANTOPRAZOLE SODIUM 40 MILLIGRAM(S): 20 TABLET, DELAYED RELEASE ORAL at 06:02

## 2021-07-07 RX ADMIN — Medication 975 MILLIGRAM(S): at 07:40

## 2021-07-07 RX ADMIN — GABAPENTIN 300 MILLIGRAM(S): 400 CAPSULE ORAL at 06:01

## 2021-07-07 RX ADMIN — SODIUM CHLORIDE 3 MILLILITER(S): 9 INJECTION INTRAMUSCULAR; INTRAVENOUS; SUBCUTANEOUS at 05:52

## 2021-07-07 RX ADMIN — Medication 2: at 08:08

## 2021-07-07 RX ADMIN — CELECOXIB 200 MILLIGRAM(S): 200 CAPSULE ORAL at 06:02

## 2021-07-07 NOTE — PROGRESS NOTE ADULT - ASSESSMENT
56 y/o M with hx of HTN, DM, HLD, chronic back pain, he had back trauma from a fall at work in 2014, he is s/p laminotomy and partial facetectomy L4-L5  September 2020, he has been having cervical spine pain has progressively gotten worse within the past months, associated with neck stiffness, cervical spine pain radiates to bilateral upper extremities, bilateral upper extremities numbness and tingling worse on his left arm, Pt report prior conservative treatment of physical and steroid injection with mild relieved, patient came in here for elective anterior cervical discectomy fusion and instrumentation C5-C7 for cervical disc displacement on 7/6/21 with Dr. Justice Norton, s/p procedure.     Plan:   Cervical disc displacement s/p anterior cervical discectomy fusion and instrumentation C5-C7 post op day 01:     Pain meds and DVT prophylaxis as per Ortho spine service  Bowel meds  IV fluids, will d/c IV fluids as blood pressure is stable  Monitor CBC  PT eval.   will monitor vitals if hypotensive will give bolus of fluids.     HTN: will continue with his valsartan-hydrochlorothiazide 160mg-12.5mg once a day with holding parameters.     GERD: continue NexIUM 40 mg once a day.     HLD: continue with Lipitor 20 mg once a day.     DM: will hold metformin will do FS and coverage insulin    Hx of asthma: Ventolin inhaler as needed.     leucocytosis: likely reactive, no focus of infection, no fever    Patient is stable from the medicine point of view for discharge pending PT and Ortho eval.

## 2021-07-07 NOTE — PROGRESS NOTE ADULT - SUBJECTIVE AND OBJECTIVE BOX
I fit and delivered Viet with a Soft collar as ordered. He was instructed on donning and cleaning of the collar.  [facility]  [phone]
[id number]  OHLLEY MORGAN    Patient is a 55y Male s/p Anterior cervical discectomy and fusion C5-C7, POD#1. Patient seen and evaluated at bedside. Patient is doing well. Patient is ambulating with PT. Patient reports resolution of pre-op numbness and tingling to arms and hands. Denies any fever/chills, SOB, CP, N/V, numbness/tingling. No other orthopedic complaints.       T(C): 36.3 (07-07-21 @ 04:15), Max: 36.8 (07-06-21 @ 21:39)  HR: 82 (07-07-21 @ 04:15) (82 - 100)  BP: 126/86 (07-07-21 @ 04:15) (108/87 - 139/77)  RR: 18 (07-07-21 @ 04:15) (14 - 20)  SpO2: 93% (07-07-21 @ 04:15) (93% - 98%)                          11.4   13.99 )-----------( 147      ( 07 Jul 2021 05:39 )             33.2       Physical Exam:    General: Awake, alert, in NAD  Spine: Dressing C/D/I, +drain present (Output 0cc)  Motor exam: [ x ]      Upper extremity                              Delt        Bic         Tric       WF      WE                                               R         5/5        5/5        5/5       5/5       5/5                                               L          5/5        5/5        5/5       5/5      5/5           Lower extremity                                HF         KE          TA       EHL         GS                                                 R        5/5        5/5        5/5       5/5         5/5                                               L         5/5        5/5       5/5       5/5          5/5    SILT C5-T1 B/L, L2-S1 intact B/L, DP pulses 2+ B/L  Calf soft, NT B/L    07-06-21 @ 07:01  -  07-07-21 @ 07:00  --------------------------------------------------------  IN: 1935 mL / OUT: 850 mL / NET: 1085 mL        A/P: Patient is a 55y Male s/p Anterior cervical discectomy and fusion C5-C7, POD1    * Pain control  * DVTP: SCDs only  * Continue drain until < 30cc, will pull drain today. D/C ABX after removal  * PT  * WBAT  * Soft collar ordered  * Dispo: Home today when cleared by PT and medicine    
Orthopedic PA Postop Note  Patient S/P ACDF c5-c7    Appearance: Alert, responsive, in no acute distress.    Skin: no rash on visible skin. Skin is clean, dry and intact. No bleeding. No abrasions. No ulcerations.    Vascular: 2+ distal pulses. Cap refill < 2 sec. No signs of venous insuffiency or stasis. No extremity ulcerations. No cyanosis.    Neurological: Sensation is grossly intact to light touch. No focal deficits or weaknesses found.    Pathologic reflexes: negative babinski, negative clonus, negative randle    Drain x 1 functioning    Dressing: anterior cervical dressing c/d/i           Motor exam: [  ]          [ ] Upper extremity                    Bi(c5)  WE(c6)  EE(c7)   FF(c8)                                                R         5/5        5/5        5/5       5/5                                               L          5/5        5/5        5/5       5/5         [ ] Lower extremeity                 HF(l2)   KE(l3)    TA(l4)   EHL(l5)  GS(s1)                                                 R        5/5        5/5        5/5       5/5         5/5                                               L         5/5        5/5       5/5       5/5          5/5     A/P:  S/P ACDF c5-c7  1. DVTP - SCDs  2. Physical Therapy   3. Pain Control as clinically indicated 
Patient seen today 7/7/21 POD#1 s/p acdf C5-7. Reports some anterior neck pain, no radicular pain. AVSS. Drain functional, dressing intact. Moves bilateral upper and lower extremities to command with good strength.  Plan: D/c hemovac when <30 cc. D/c supplemental oxygen as tolerated. Continue incentive spirometer and scd's. Likely d/c home today. Order soft cervical collar for comfort.
HOLLEY MORGAN    [id number]    55y      Male    Patient is a 55y old  Male who presents with a chief complaint of spine surgery (07 Jul 2021 07:09)      INTERVAL HPI/OVERNIGHT EVENTS:    Patient is doing ok, pain is well managed with pain medications, working well with PT, has no fever, chills, chest pain, SOB, nausea, vomiting, constipation.     REVIEW OF SYSTEMS:    CONSTITUTIONAL: No fever, fatigue  RESPIRATORY: No cough, No shortness of breath  CARDIOVASCULAR: No chest pain, palpitations  GASTROINTESTINAL: No abdominal, No nausea, vomiting  NEUROLOGICAL: No headaches,  loss of strength.  MISCELLANEOUS: neck pain is better       Vital Signs Last 24 Hrs  T(C): 36.5 (07 Jul 2021 08:45), Max: 36.8 (06 Jul 2021 21:39)  T(F): 97.7 (07 Jul 2021 08:45), Max: 98.2 (06 Jul 2021 21:39)  HR: 76 (07 Jul 2021 08:45) (76 - 93)  BP: 142/88 (07 Jul 2021 08:45) (108/87 - 142/88)  BP(mean): --  RR: 18 (07 Jul 2021 08:45) (14 - 20)  SpO2: 98% (07 Jul 2021 08:45) (93% - 98%)    PHYSICAL EXAM:    GENERAL: Middle age male looking comfortable   HEENT: PERRL, +EOMI  NECK: Surgical dressing on anterior aspect of the neck  CHEST/LUNG: Clear to auscultate bilaterally; No wheezing  HEART: S1S2+, Regular rate and rhythm; No murmurs  ABDOMEN: Soft, Nontender, Nondistended; Bowel sounds present  EXTREMITIES:  2+ Peripheral Pulses, No edema  SKIN: No rashes or lesions  NEURO: AAOX3, no focal deficits, no motor r sensory loss  PSYCH: normal mood      LABS:                        11.4   13.99 )-----------( 147      ( 07 Jul 2021 05:39 )             33.2     07-07    136  |  101  |  20.0  ----------------------------<  155<H>  4.2   |  22.0  |  0.89    Ca    9.1      07 Jul 2021 05:39              I&O's Summary    06 Jul 2021 07:01  -  07 Jul 2021 07:00  --------------------------------------------------------  IN: 1935 mL / OUT: 850 mL / NET: 1085 mL        MEDICATIONS  (STANDING):  acetaminophen   Tablet .. 975 milliGRAM(s) Oral every 8 hours  atorvastatin 20 milliGRAM(s) Oral at bedtime  dextrose 40% Gel 15 Gram(s) Oral once  dextrose 5%. 1000 milliLiter(s) (50 mL/Hr) IV Continuous <Continuous>  dextrose 5%. 1000 milliLiter(s) (100 mL/Hr) IV Continuous <Continuous>  dextrose 50% Injectable 25 Gram(s) IV Push once  dextrose 50% Injectable 12.5 Gram(s) IV Push once  dextrose 50% Injectable 25 Gram(s) IV Push once  gabapentin 300 milliGRAM(s) Oral three times a day  glucagon  Injectable 1 milliGRAM(s) IntraMuscular once  HYDROmorphone  Injectable 0.5 milliGRAM(s) IV Push once  insulin lispro (ADMELOG) corrective regimen sliding scale   SubCutaneous three times a day before meals  methocarbamol 750 milliGRAM(s) Oral three times a day  nicotine -   7 mG/24Hr(s) Patch 1 patch Transdermal daily  pantoprazole    Tablet 40 milliGRAM(s) Oral before breakfast  senna 2 Tablet(s) Oral at bedtime  sodium chloride 0.9% lock flush 3 milliLiter(s) IV Push every 8 hours  sodium chloride 0.9%. 1000 milliLiter(s) (100 mL/Hr) IV Continuous <Continuous>    MEDICATIONS  (PRN):  ALBUTerol    90 MICROgram(s) HFA Inhaler 2 Puff(s) Inhalation every 6 hours PRN Bronchospasm  ALBUTerol  90 MICROgram(s) HFA Inhaler - Peds 2 Puff(s) Inhalation every 6 hours PRN SOB or cough  ALPRAZolam 1 milliGRAM(s) Oral daily PRN anxiety  ondansetron Injectable 4 milliGRAM(s) IV Push every 6 hours PRN Nausea and/or Vomiting  oxyCODONE    IR 5 milliGRAM(s) Oral every 3 hours PRN Mild Pain (1 - 3)  oxyCODONE    IR 10 milliGRAM(s) Oral every 3 hours PRN Moderate Pain (4 - 6)

## 2021-07-07 NOTE — DISCHARGE NOTE NURSING/CASE MANAGEMENT/SOCIAL WORK - PATIENT PORTAL LINK FT
You can access the FollowMyHealth Patient Portal offered by [facility] by registering at the following website: [email or web address]. By joining [employer]’s FollowMyHealth portal, you will also be able to view your health information using other applications (apps) compatible with our system.

## 2021-11-25 NOTE — CONSULT NOTE ADULT - ATTENDING COMMENTS
General Thank you for the courtesy of this consult .    Medically stable to d/c once cleared by physical therapy / ortho